# Patient Record
Sex: MALE | Race: WHITE | NOT HISPANIC OR LATINO | URBAN - METROPOLITAN AREA
[De-identification: names, ages, dates, MRNs, and addresses within clinical notes are randomized per-mention and may not be internally consistent; named-entity substitution may affect disease eponyms.]

---

## 2017-01-17 PROBLEM — C61 PROSTATE CANCER: Status: ACTIVE | Noted: 2017-01-17

## 2019-01-28 ENCOUNTER — HOSPITAL ENCOUNTER (INPATIENT)
Facility: OTHER | Age: 79
LOS: 1 days | DRG: 308 | End: 2019-01-29
Attending: EMERGENCY MEDICINE | Admitting: INTERNAL MEDICINE
Payer: MEDICARE

## 2019-01-28 DIAGNOSIS — I46.9 CARDIAC ARREST: Primary | ICD-10-CM

## 2019-01-28 DIAGNOSIS — J84.10 PULMONARY FIBROSIS: Chronic | ICD-10-CM

## 2019-01-28 DIAGNOSIS — R07.9 CHEST PAIN: ICD-10-CM

## 2019-01-28 DIAGNOSIS — G93.1 ANOXIC BRAIN INJURY: ICD-10-CM

## 2019-01-28 DIAGNOSIS — N17.9 ACUTE KIDNEY INJURY: ICD-10-CM

## 2019-01-28 DIAGNOSIS — J96.01 ACUTE HYPOXEMIC RESPIRATORY FAILURE: ICD-10-CM

## 2019-01-28 DIAGNOSIS — I21.4 NSTEMI (NON-ST ELEVATED MYOCARDIAL INFARCTION): ICD-10-CM

## 2019-01-28 PROBLEM — E87.20 LACTIC ACIDOSIS: Status: ACTIVE | Noted: 2019-01-28

## 2019-01-28 PROBLEM — C61 PROSTATE CANCER: Chronic | Status: ACTIVE | Noted: 2019-01-28

## 2019-01-28 PROBLEM — E78.5 HYPERLIPIDEMIA: Chronic | Status: ACTIVE | Noted: 2019-01-28

## 2019-01-28 PROBLEM — E87.20 METABOLIC ACIDOSIS: Status: ACTIVE | Noted: 2019-01-28

## 2019-01-28 PROBLEM — R79.89 ELEVATED TROPONIN: Status: ACTIVE | Noted: 2019-01-28

## 2019-01-28 PROBLEM — R79.89 ELEVATED BRAIN NATRIURETIC PEPTIDE (BNP) LEVEL: Status: ACTIVE | Noted: 2019-01-28

## 2019-01-28 PROBLEM — G93.41 METABOLIC ENCEPHALOPATHY: Status: ACTIVE | Noted: 2019-01-28

## 2019-01-28 PROBLEM — R74.01 TRANSAMINITIS: Status: ACTIVE | Noted: 2019-01-28

## 2019-01-28 LAB
ALBUMIN SERPL BCP-MCNC: 2.2 G/DL
ALBUMIN SERPL BCP-MCNC: 2.7 G/DL
ALLENS TEST: ABNORMAL
ALLENS TEST: ABNORMAL
ALP SERPL-CCNC: 141 U/L
ALP SERPL-CCNC: 179 U/L
ALT SERPL W/O P-5'-P-CCNC: 290 U/L
ALT SERPL W/O P-5'-P-CCNC: 372 U/L
ANION GAP SERPL CALC-SCNC: 17 MMOL/L
ANION GAP SERPL CALC-SCNC: 18 MMOL/L
ANION GAP SERPL CALC-SCNC: 22 MMOL/L
ANISOCYTOSIS BLD QL SMEAR: SLIGHT
APTT BLDCRRT: 35.4 SEC
AST SERPL-CCNC: 450 U/L
AST SERPL-CCNC: 675 U/L
BACTERIA #/AREA URNS HPF: ABNORMAL /HPF
BASOPHILS # BLD AUTO: ABNORMAL K/UL
BASOPHILS NFR BLD: 0 %
BILIRUB SERPL-MCNC: 0.7 MG/DL
BILIRUB SERPL-MCNC: 1.2 MG/DL
BILIRUB UR QL STRIP: ABNORMAL
BNP SERPL-MCNC: 850 PG/ML
BUN SERPL-MCNC: 32 MG/DL
BUN SERPL-MCNC: 35 MG/DL
BUN SERPL-MCNC: 35 MG/DL
BURR CELLS BLD QL SMEAR: ABNORMAL
CALCIUM SERPL-MCNC: 7.2 MG/DL
CALCIUM SERPL-MCNC: 7.2 MG/DL
CALCIUM SERPL-MCNC: 8.5 MG/DL
CHLORIDE SERPL-SCNC: 93 MMOL/L
CHLORIDE SERPL-SCNC: 95 MMOL/L
CHLORIDE SERPL-SCNC: 95 MMOL/L
CK SERPL-CCNC: 569 U/L
CLARITY UR: ABNORMAL
CO2 SERPL-SCNC: 22 MMOL/L
CO2 SERPL-SCNC: 23 MMOL/L
CO2 SERPL-SCNC: 23 MMOL/L
COLOR UR: ABNORMAL
CREAT SERPL-MCNC: 1.4 MG/DL
CREAT SERPL-MCNC: 1.5 MG/DL
CREAT SERPL-MCNC: 1.5 MG/DL
DACRYOCYTES BLD QL SMEAR: ABNORMAL
DELSYS: ABNORMAL
DELSYS: ABNORMAL
DIFFERENTIAL METHOD: ABNORMAL
EOSINOPHIL # BLD AUTO: ABNORMAL K/UL
EOSINOPHIL NFR BLD: 0 %
ERYTHROCYTE [DISTWIDTH] IN BLOOD BY AUTOMATED COUNT: 17.2 %
ERYTHROCYTE [SEDIMENTATION RATE] IN BLOOD BY WESTERGREN METHOD: 20 MM/H
ERYTHROCYTE [SEDIMENTATION RATE] IN BLOOD BY WESTERGREN METHOD: 25 MM/H
EST. GFR  (AFRICAN AMERICAN): 50 ML/MIN/1.73 M^2
EST. GFR  (AFRICAN AMERICAN): 50 ML/MIN/1.73 M^2
EST. GFR  (AFRICAN AMERICAN): 55 ML/MIN/1.73 M^2
EST. GFR  (NON AFRICAN AMERICAN): 44 ML/MIN/1.73 M^2
EST. GFR  (NON AFRICAN AMERICAN): 44 ML/MIN/1.73 M^2
EST. GFR  (NON AFRICAN AMERICAN): 47 ML/MIN/1.73 M^2
FIO2: 100
FIO2: 35
GIANT PLATELETS BLD QL SMEAR: PRESENT
GLUCOSE SERPL-MCNC: 168 MG/DL
GLUCOSE SERPL-MCNC: 228 MG/DL
GLUCOSE SERPL-MCNC: 230 MG/DL
GLUCOSE SERPL-MCNC: 230 MG/DL
GLUCOSE SERPL-MCNC: 233 MG/DL
GLUCOSE UR QL STRIP: NEGATIVE
HCO3 UR-SCNC: 19.6 MMOL/L (ref 24–28)
HCO3 UR-SCNC: 23.2 MMOL/L (ref 24–28)
HCT VFR BLD AUTO: 33.8 %
HGB BLD-MCNC: 10.8 G/DL
HGB UR QL STRIP: ABNORMAL
HYALINE CASTS #/AREA URNS LPF: 0 /LPF
INR PPP: 1.4
KETONES UR QL STRIP: NEGATIVE
LACTATE SERPL-SCNC: >12 MMOL/L
LEUKOCYTE ESTERASE UR QL STRIP: NEGATIVE
LYMPHOCYTES # BLD AUTO: ABNORMAL K/UL
LYMPHOCYTES NFR BLD: 23 %
MAGNESIUM SERPL-MCNC: 2.1 MG/DL
MAGNESIUM SERPL-MCNC: 2.7 MG/DL
MCH RBC QN AUTO: 29.4 PG
MCHC RBC AUTO-ENTMCNC: 32 G/DL
MCV RBC AUTO: 92 FL
METAMYELOCYTES NFR BLD MANUAL: 3 %
MICROSCOPIC COMMENT: ABNORMAL
MIN VOL: 10
MODE: ABNORMAL
MODE: ABNORMAL
MONOCYTES # BLD AUTO: ABNORMAL K/UL
MONOCYTES NFR BLD: 6 %
NEUTROPHILS # BLD AUTO: ABNORMAL K/UL
NEUTROPHILS NFR BLD: 65 %
NEUTS BAND NFR BLD MANUAL: 3 %
NITRITE UR QL STRIP: NEGATIVE
NON-SQ EPI CELLS #/AREA URNS HPF: 0 /HPF
PCO2 BLDA: 36.2 MMHG (ref 35–45)
PCO2 BLDA: 56.1 MMHG (ref 35–45)
PEEP: 5
PEEP: 5
PH SMN: 7.22 [PH] (ref 7.35–7.45)
PH SMN: 7.34 [PH] (ref 7.35–7.45)
PH UR STRIP: 6 [PH] (ref 5–8)
PHOSPHATE SERPL-MCNC: 7.5 MG/DL
PIP: 23
PLATELET # BLD AUTO: 119 K/UL
PLATELET BLD QL SMEAR: ABNORMAL
PMV BLD AUTO: 9.5 FL
PO2 BLDA: 160 MMHG (ref 80–100)
PO2 BLDA: 607 MMHG (ref 80–100)
POC BE: -4 MMOL/L
POC BE: -6 MMOL/L
POC SATURATED O2: 100 % (ref 95–100)
POC SATURATED O2: 99 % (ref 95–100)
POCT GLUCOSE: 194 MG/DL (ref 70–110)
POCT GLUCOSE: 49 MG/DL (ref 70–110)
POIKILOCYTOSIS BLD QL SMEAR: SLIGHT
POTASSIUM SERPL-SCNC: 3.4 MMOL/L
PROT SERPL-MCNC: 4.8 G/DL
PROT SERPL-MCNC: 6 G/DL
PROT UR QL STRIP: ABNORMAL
PROTHROMBIN TIME: 15.1 SEC
RBC # BLD AUTO: 3.67 M/UL
RBC #/AREA URNS HPF: >100 /HPF (ref 0–4)
SAMPLE: ABNORMAL
SAMPLE: ABNORMAL
SCHISTOCYTES BLD QL SMEAR: PRESENT
SITE: ABNORMAL
SITE: ABNORMAL
SODIUM SERPL-SCNC: 135 MMOL/L
SODIUM SERPL-SCNC: 135 MMOL/L
SODIUM SERPL-SCNC: 138 MMOL/L
SP GR UR STRIP: 1.02 (ref 1–1.03)
SQUAMOUS #/AREA URNS HPF: 2 /HPF
TROPONIN I SERPL DL<=0.01 NG/ML-MCNC: 0.15 NG/ML
TROPONIN I SERPL DL<=0.01 NG/ML-MCNC: 5.43 NG/ML
URN SPEC COLLECT METH UR: ABNORMAL
UROBILINOGEN UR STRIP-ACNC: NEGATIVE EU/DL
VT: 350
VT: 350
WBC # BLD AUTO: 14.75 K/UL
WBC #/AREA URNS HPF: 20 /HPF (ref 0–5)
WBC CLUMPS URNS QL MICRO: ABNORMAL
YEAST URNS QL MICRO: ABNORMAL

## 2019-01-28 PROCEDURE — 25000003 PHARM REV CODE 250: Performed by: EMERGENCY MEDICINE

## 2019-01-28 PROCEDURE — 36556 INSERT NON-TUNNEL CV CATH: CPT

## 2019-01-28 PROCEDURE — 85730 THROMBOPLASTIN TIME PARTIAL: CPT

## 2019-01-28 PROCEDURE — 80053 COMPREHEN METABOLIC PANEL: CPT | Mod: 91

## 2019-01-28 PROCEDURE — 96376 TX/PRO/DX INJ SAME DRUG ADON: CPT

## 2019-01-28 PROCEDURE — 87040 BLOOD CULTURE FOR BACTERIA: CPT | Mod: 59

## 2019-01-28 PROCEDURE — 27000221 HC OXYGEN, UP TO 24 HOURS

## 2019-01-28 PROCEDURE — 99291 CRITICAL CARE FIRST HOUR: CPT | Mod: 25

## 2019-01-28 PROCEDURE — 99291 CRITICAL CARE FIRST HOUR: CPT | Mod: ,,, | Performed by: INTERNAL MEDICINE

## 2019-01-28 PROCEDURE — 87070 CULTURE OTHR SPECIMN AEROBIC: CPT

## 2019-01-28 PROCEDURE — 82947 ASSAY GLUCOSE BLOOD QUANT: CPT

## 2019-01-28 PROCEDURE — 94002 VENT MGMT INPAT INIT DAY: CPT

## 2019-01-28 PROCEDURE — 96365 THER/PROPH/DIAG IV INF INIT: CPT

## 2019-01-28 PROCEDURE — 93005 ELECTROCARDIOGRAM TRACING: CPT

## 2019-01-28 PROCEDURE — 84484 ASSAY OF TROPONIN QUANT: CPT | Mod: 91

## 2019-01-28 PROCEDURE — 27100171 HC OXYGEN HIGH FLOW UP TO 24 HOURS

## 2019-01-28 PROCEDURE — 25000003 PHARM REV CODE 250: Performed by: INTERNAL MEDICINE

## 2019-01-28 PROCEDURE — 80053 COMPREHEN METABOLIC PANEL: CPT

## 2019-01-28 PROCEDURE — 87040 BLOOD CULTURE FOR BACTERIA: CPT

## 2019-01-28 PROCEDURE — 80048 BASIC METABOLIC PNL TOTAL CA: CPT

## 2019-01-28 PROCEDURE — 63600175 PHARM REV CODE 636 W HCPCS: Performed by: EMERGENCY MEDICINE

## 2019-01-28 PROCEDURE — 25000003 PHARM REV CODE 250

## 2019-01-28 PROCEDURE — 99291 PR CRITICAL CARE, E/M 30-74 MINUTES: ICD-10-PCS | Mod: ,,, | Performed by: INTERNAL MEDICINE

## 2019-01-28 PROCEDURE — 80061 LIPID PANEL: CPT

## 2019-01-28 PROCEDURE — 82962 GLUCOSE BLOOD TEST: CPT

## 2019-01-28 PROCEDURE — 36600 WITHDRAWAL OF ARTERIAL BLOOD: CPT

## 2019-01-28 PROCEDURE — 96375 TX/PRO/DX INJ NEW DRUG ADDON: CPT | Mod: 59

## 2019-01-28 PROCEDURE — 93010 ELECTROCARDIOGRAM REPORT: CPT | Mod: 76,,, | Performed by: INTERNAL MEDICINE

## 2019-01-28 PROCEDURE — 93010 EKG 12-LEAD: ICD-10-PCS | Mod: 76,,, | Performed by: INTERNAL MEDICINE

## 2019-01-28 PROCEDURE — 87205 SMEAR GRAM STAIN: CPT

## 2019-01-28 PROCEDURE — 99292 CRITICAL CARE ADDL 30 MIN: CPT | Mod: ,,, | Performed by: INTERNAL MEDICINE

## 2019-01-28 PROCEDURE — 84484 ASSAY OF TROPONIN QUANT: CPT

## 2019-01-28 PROCEDURE — 99292 PR CRITICAL CARE, ADDL 30 MIN: ICD-10-PCS | Mod: ,,, | Performed by: INTERNAL MEDICINE

## 2019-01-28 PROCEDURE — 36415 COLL VENOUS BLD VENIPUNCTURE: CPT

## 2019-01-28 PROCEDURE — 63600175 PHARM REV CODE 636 W HCPCS: Performed by: INTERNAL MEDICINE

## 2019-01-28 PROCEDURE — 99900026 HC AIRWAY MAINTENANCE (STAT)

## 2019-01-28 PROCEDURE — 85007 BL SMEAR W/DIFF WBC COUNT: CPT

## 2019-01-28 PROCEDURE — 82550 ASSAY OF CK (CPK): CPT

## 2019-01-28 PROCEDURE — 82803 BLOOD GASES ANY COMBINATION: CPT

## 2019-01-28 PROCEDURE — 83735 ASSAY OF MAGNESIUM: CPT | Mod: 91

## 2019-01-28 PROCEDURE — 96372 THER/PROPH/DIAG INJ SC/IM: CPT

## 2019-01-28 PROCEDURE — 94761 N-INVAS EAR/PLS OXIMETRY MLT: CPT

## 2019-01-28 PROCEDURE — 85027 COMPLETE CBC AUTOMATED: CPT

## 2019-01-28 PROCEDURE — 85610 PROTHROMBIN TIME: CPT

## 2019-01-28 PROCEDURE — 63600175 PHARM REV CODE 636 W HCPCS: Performed by: STUDENT IN AN ORGANIZED HEALTH CARE EDUCATION/TRAINING PROGRAM

## 2019-01-28 PROCEDURE — 83520 IMMUNOASSAY QUANT NOS NONAB: CPT

## 2019-01-28 PROCEDURE — 83880 ASSAY OF NATRIURETIC PEPTIDE: CPT

## 2019-01-28 PROCEDURE — 87086 URINE CULTURE/COLONY COUNT: CPT

## 2019-01-28 PROCEDURE — 81000 URINALYSIS NONAUTO W/SCOPE: CPT

## 2019-01-28 PROCEDURE — 99900035 HC TECH TIME PER 15 MIN (STAT)

## 2019-01-28 PROCEDURE — 83605 ASSAY OF LACTIC ACID: CPT

## 2019-01-28 PROCEDURE — 20000000 HC ICU ROOM

## 2019-01-28 PROCEDURE — 84100 ASSAY OF PHOSPHORUS: CPT

## 2019-01-28 PROCEDURE — 83735 ASSAY OF MAGNESIUM: CPT

## 2019-01-28 PROCEDURE — 93010 ELECTROCARDIOGRAM REPORT: CPT | Mod: ,,, | Performed by: INTERNAL MEDICINE

## 2019-01-28 PROCEDURE — C9113 INJ PANTOPRAZOLE SODIUM, VIA: HCPCS | Performed by: STUDENT IN AN ORGANIZED HEALTH CARE EDUCATION/TRAINING PROGRAM

## 2019-01-28 RX ORDER — AMIODARONE HYDROCHLORIDE 150 MG/3ML
INJECTION, SOLUTION INTRAVENOUS CODE/TRAUMA/SEDATION MEDICATION
Status: COMPLETED | OUTPATIENT
Start: 2019-01-28 | End: 2019-01-28

## 2019-01-28 RX ORDER — ACETAMINOPHEN 650 MG/20.3ML
650 LIQUID ORAL EVERY 6 HOURS
Status: DISCONTINUED | OUTPATIENT
Start: 2019-01-28 | End: 2019-01-29 | Stop reason: HOSPADM

## 2019-01-28 RX ORDER — SODIUM CHLORIDE 9 MG/ML
INJECTION, SOLUTION INTRAVENOUS
Status: COMPLETED | OUTPATIENT
Start: 2019-01-28 | End: 2019-01-28

## 2019-01-28 RX ORDER — PANTOPRAZOLE SODIUM 40 MG/10ML
80 INJECTION, POWDER, LYOPHILIZED, FOR SOLUTION INTRAVENOUS ONCE
Status: COMPLETED | OUTPATIENT
Start: 2019-01-28 | End: 2019-01-28

## 2019-01-28 RX ORDER — SODIUM CHLORIDE 0.9 % (FLUSH) 0.9 %
3 SYRINGE (ML) INJECTION
Status: DISCONTINUED | OUTPATIENT
Start: 2019-01-28 | End: 2019-01-29 | Stop reason: HOSPADM

## 2019-01-28 RX ORDER — BUSPIRONE HYDROCHLORIDE 10 MG/1
30 TABLET ORAL ONCE
Status: DISCONTINUED | OUTPATIENT
Start: 2019-01-28 | End: 2019-01-29 | Stop reason: HOSPADM

## 2019-01-28 RX ORDER — POTASSIUM CHLORIDE 14.9 MG/ML
20 INJECTION INTRAVENOUS ONCE
Status: COMPLETED | OUTPATIENT
Start: 2019-01-28 | End: 2019-01-28

## 2019-01-28 RX ORDER — MAGNESIUM SULFATE HEPTAHYDRATE 40 MG/ML
2 INJECTION, SOLUTION INTRAVENOUS
Status: COMPLETED | OUTPATIENT
Start: 2019-01-28 | End: 2019-01-28

## 2019-01-28 RX ORDER — VASOPRESSIN 20 [USP'U]/ML
INJECTION, SOLUTION INTRAMUSCULAR; SUBCUTANEOUS
Status: COMPLETED
Start: 2019-01-28 | End: 2019-01-28

## 2019-01-28 RX ORDER — PANTOPRAZOLE SODIUM 40 MG/10ML
40 INJECTION, POWDER, LYOPHILIZED, FOR SOLUTION INTRAVENOUS EVERY 12 HOURS
Status: DISCONTINUED | OUTPATIENT
Start: 2019-01-29 | End: 2019-01-29 | Stop reason: HOSPADM

## 2019-01-28 RX ORDER — VANCOMYCIN HCL IN 5 % DEXTROSE 1G/250ML
15 PLASTIC BAG, INJECTION (ML) INTRAVENOUS EVERY 24 HOURS
Status: DISCONTINUED | OUTPATIENT
Start: 2019-01-29 | End: 2019-01-29 | Stop reason: HOSPADM

## 2019-01-28 RX ADMIN — VANCOMYCIN HYDROCHLORIDE 2000 MG: 10 INJECTION, POWDER, LYOPHILIZED, FOR SOLUTION INTRAVENOUS at 06:01

## 2019-01-28 RX ADMIN — PANTOPRAZOLE SODIUM 80 MG: 40 INJECTION, POWDER, FOR SOLUTION INTRAVENOUS at 10:01

## 2019-01-28 RX ADMIN — MAGNESIUM SULFATE IN WATER 2 G: 40 INJECTION, SOLUTION INTRAVENOUS at 09:01

## 2019-01-28 RX ADMIN — NOREPINEPHRINE BITARTRATE 3 MCG/KG/MIN: 1 INJECTION, SOLUTION, CONCENTRATE INTRAVENOUS at 04:01

## 2019-01-28 RX ADMIN — AMIODARONE HYDROCHLORIDE 1 MG/MIN: 1.8 INJECTION, SOLUTION INTRAVENOUS at 05:01

## 2019-01-28 RX ADMIN — SODIUM CHLORIDE 1000 ML/HR: 0.9 INJECTION, SOLUTION INTRAVENOUS at 04:01

## 2019-01-28 RX ADMIN — DEXTROSE MONOHYDRATE 25 G: 25 INJECTION, SOLUTION INTRAVENOUS at 04:01

## 2019-01-28 RX ADMIN — AMIODARONE HYDROCHLORIDE 300 MG: 50 INJECTION, SOLUTION INTRAVENOUS at 04:01

## 2019-01-28 RX ADMIN — VASOPRESSIN 0.04 UNITS/MIN: 20 INJECTION, SOLUTION INTRAMUSCULAR; SUBCUTANEOUS at 10:01

## 2019-01-28 RX ADMIN — POTASSIUM CHLORIDE 20 MEQ: 14.9 INJECTION, SOLUTION INTRAVENOUS at 10:01

## 2019-01-28 RX ADMIN — NOREPINEPHRINE BITARTRATE 2.5 MCG/KG/MIN: 1 INJECTION INTRAVENOUS at 05:01

## 2019-01-28 RX ADMIN — VASOPRESSIN 0.04 UNITS/MIN: 20 INJECTION, SOLUTION INTRAMUSCULAR; SUBCUTANEOUS at 05:01

## 2019-01-28 RX ADMIN — VASOPRESSIN: 20 INJECTION, SOLUTION INTRAMUSCULAR; SUBCUTANEOUS at 04:01

## 2019-01-28 RX ADMIN — PIPERACILLIN AND TAZOBACTAM 4.5 G: 4; .5 INJECTION, POWDER, LYOPHILIZED, FOR SOLUTION INTRAVENOUS; PARENTERAL at 10:01

## 2019-01-28 RX ADMIN — SODIUM CHLORIDE 2250 ML: 0.9 INJECTION, SOLUTION INTRAVENOUS at 06:01

## 2019-01-28 RX ADMIN — AMIODARONE HYDROCHLORIDE 1 MG/MIN: 1.8 INJECTION, SOLUTION INTRAVENOUS at 09:01

## 2019-01-28 NOTE — CODE DOCUMENTATION
Levophed started 8 mg in 250 ml.  Rate started at 3mcg/kg/min.  MD at bedside to start central line.  Pressure 60 with doppler.

## 2019-01-28 NOTE — ED PROVIDER NOTES
Encounter Date: 1/28/2019    SCRIBE #1 NOTE: I, Khushbu Barba, am scribing for, and in the presence of, Dr. Valencia.       History     Chief Complaint   Patient presents with    Cardiac Arrest     pt  found down by wife .  pt down 15 min before ems arrival .     Time seen by provider: 4:40 PM    This is a 76 y.o. male who presents after being found down by his wife.  Upon EMS arrival, patient was pulseless, found to be in PEA arrest.  CPR and ACLS initiated, 3 rounds of epi given along with 1.5 amps of bicarb.  ROSC was obtained, however upon arrival pulses were once again lost.  The patient was down approximately ten to fifteen minutes before EMS arrival.       The history is provided by the EMS personnel. The history is limited by the condition of the patient.     Review of patient's allergies indicates:  No Known Allergies  Past Medical History:   Diagnosis Date    Hyperlipidemia     Prostate cancer     Pulmonary fibrosis      History reviewed. No pertinent surgical history.  Family History   Problem Relation Age of Onset    Heart disease Father      Social History     Tobacco Use    Smoking status: Unknown If Ever Smoked   Substance Use Topics    Alcohol use: Not on file    Drug use: Not on file     Review of Systems   Unable to perform ROS: Patient unresponsive       Physical Exam     Initial Vitals   BP Pulse Resp Temp SpO2   01/28/19 1622 01/28/19 1622 01/28/19 1627 01/28/19 1703 01/28/19 2015   (!) 142/74 86 10 96.7 °F (35.9 °C) 98 %      MAP       --                Physical Exam    Nursing note and vitals reviewed.  Constitutional:   Unresponsive.     HENT:   Head: Normocephalic and atraumatic.   Eyes:   Pupils fixed and dilated.   Neck:   C-collar in place   Pulmonary/Chest:   Coarse breath sounds with ventilation.  No spontaneous respirations   Abdominal: He exhibits no distension.   Genitourinary: Penis normal.   Musculoskeletal:   Peripheral edema with venous insufficiency in lower extremities  "  Neurological: He is unresponsive.   No posturing.  Unresponsive, does not withdraw to pain.         ED Course   Central Line  Date/Time: 1/28/2019 5:32 PM  Performed by: Oralia Valencia MD  Consent Done: Not Needed  Time out: Immediately prior to procedure a "time out" was called to verify the correct patient, procedure, equipment, support staff and site/side marked as required.  Indications: vascular access and hemodynamic monitoring  Preparation: skin prepped with ChloraPrep  Skin prep agent dried: skin prep agent completely dried prior to procedure  Sterile barriers: all five maximum sterile barriers used - cap, mask, sterile gown, sterile gloves, and large sterile sheet  Hand hygiene: hand hygiene performed prior to central venous catheter insertion  Location details: right femoral  Site selection rationale: post cardiac arrest  Catheter type: triple lumen  Catheter size: 7 Fr  Catheter Length: 20cm    Ultrasound guidance: yes  Vessel Caliber: medium, patent, compressibility normal  Needle advanced into vessel with real time Ultrasound guidance.  Guidewire confirmed in vessel.  Sterile sheath used.  Number of attempts: 1  Post-procedure: chlorhexidine patch,  line sutured,  sterile dressing applied and blood return through all ports    Critical Care  Date/Time: 1/28/2019 5:35 PM  Performed by: Oralia Valencia MD  Authorized by: Oralia Valencia MD   Direct patient critical care time: 15 minutes  Additional history critical care time: 10 minutes  Ordering / reviewing critical care time: 10 minutes  Documentation critical care time: 10 minutes  Consulting other physicians critical care time: 5 minutes  Consult with family critical care time: 15 minutes  Total critical care time (exclusive of procedural time) : 65 minutes  Critical care time was exclusive of separately billable procedures and treating other patients and teaching time.  Critical care was necessary to treat or prevent imminent or life-threatening " deterioration of the following conditions: circulatory failure, cardiac failure and respiratory failure.  Critical care was time spent personally by me on the following activities: evaluation of patient's response to treatment, obtaining history from patient or surrogate, ordering and review of laboratory studies, pulse oximetry, review of old charts, re-evaluation of patient's condition, ordering and review of radiographic studies, ordering and performing treatments and interventions, examination of patient and development of treatment plan with patient or surrogate.        Labs Reviewed   TROPONIN I - Abnormal; Notable for the following components:       Result Value    Troponin I 0.146 (*)     All other components within normal limits   COMPREHENSIVE METABOLIC PANEL - Abnormal; Notable for the following components:    Potassium 3.4 (*)     Chloride 93 (*)     Glucose 168 (*)     BUN, Bld 32 (*)     Calcium 8.5 (*)     Albumin 2.7 (*)     Alkaline Phosphatase 141 (*)      (*)      (*)     Anion Gap 22 (*)     eGFR if  55 (*)     eGFR if non  47 (*)     All other components within normal limits   CBC W/ AUTO DIFFERENTIAL - Abnormal; Notable for the following components:    WBC 14.75 (*)     RBC 3.67 (*)     Hemoglobin 10.8 (*)     Hematocrit 33.8 (*)     RDW 17.2 (*)     Platelets 119 (*)     All other components within normal limits   MAGNESIUM - Abnormal; Notable for the following components:    Magnesium 2.7 (*)     All other components within normal limits   LACTIC ACID, PLASMA - Abnormal; Notable for the following components:    Lactate (Lactic Acid) >12.0 (*)     All other components within normal limits    Narrative:       Lactic Acid critical result(s) called and verbal readback obtained   from Jose F Jaquez RN ER., 01/28/2019 16:57   URINALYSIS - Abnormal; Notable for the following components:    Color, UA Orange (*)     Appearance, UA Hazy (*)     Protein, UA  2+ (*)     Bilirubin (UA) 1+ (*)     Occult Blood UA 3+ (*)     All other components within normal limits   URINALYSIS MICROSCOPIC - Abnormal; Notable for the following components:    RBC, UA >100 (*)     WBC, UA 20 (*)     WBC Clumps, UA Occasional (*)     All other components within normal limits   B-TYPE NATRIURETIC PEPTIDE - Abnormal; Notable for the following components:     (*)     All other components within normal limits   POCT GLUCOSE - Abnormal; Notable for the following components:    POCT Glucose 49 (*)     All other components within normal limits   ISTAT PROCEDURE - Abnormal; Notable for the following components:    POC PH 7.225 (*)     POC PCO2 56.1 (*)     POC PO2 607 (*)     POC HCO3 23.2 (*)     All other components within normal limits   POCT GLUCOSE - Abnormal; Notable for the following components:    POCT Glucose 194 (*)     All other components within normal limits   APTT   PROTIME-INR   B-TYPE NATRIURETIC PEPTIDE   POCT GLUCOSE MONITORING CONTINUOUS     EKG Readings: (Independently Interpreted)   4:10PM:  Rate of 80.  Wide complex irregular QRS rhythm. Left axis deviation.  No other ischemic changes.     ECG Results    None       Imaging Results          X-Ray Chest AP Portable (Final result)  Result time 01/28/19 17:05:55    Final result by Naveen Mata MD (01/28/19 17:05:55)                 Impression:      1. Cardiomegaly with findings suggesting edema.  There is suspected chronic interstitial change noting fibrotic change projected over the right lower lung zone.  Comparison with previous examinations would be helpful.  No convincing pneumothorax.      Electronically signed by: Naveen Mata MD  Date:    01/28/2019  Time:    17:05             Narrative:    EXAMINATION:  XR CHEST AP PORTABLE    CLINICAL HISTORY:  Chest pain, unspecified    TECHNIQUE:  Single frontal view of the chest was performed.    COMPARISON:  None    FINDINGS:  Endotracheal tube tip lies approximately  5.4 cm above the aurea.  Defibrillator pads project over the left hemothorax.  The cardiomediastinal silhouette is enlarged, noting calcification of the aorta..  There is no pleural effusion.  The trachea is midline.  The lungs are symmetrically expanded bilaterally with coarse interstitial attenuation and patchy increased interstitial and parenchymal attenuation.  There is fibrotic change projected over the right lower lung zone and possibly left lower lung zone..  Evaluation of the left lower lung zone is limited secondary to artifact from defibrillator pads.  No large focal consolidation seen.  There is no convincing pneumothorax, noting line projected over the right apex is likely related to skin fold, pulmonary parenchymal markings are seen beyond this region..  The osseous structures are remarkable for degenerative changes.  Possible bone infarct noted of the right humerus.  There may be postsurgical change of the left humerus..                              X-Rays:   Independently Interpreted Readings:   Chest X-Ray: Trachea midline. Enlarged heart. Coarse interstitial markings.  No other acute findings.     Medical Decision Making:   History:   I obtained history from: EMS provider and someone other than patient.  Old Medical Records: I decided to obtain old medical records.  Initial Assessment:   4:40PM:  Patient is a 78-year-old male who presents to the emergency department status post cardiac arrest.  Upon arrival, patient did lose pulses again after obtaining ROSC by EMS.  Will initiate ACLS protocol.  Airway has been established and an IO placed.  Will continue to follow  Independently Interpreted Test(s):   I have ordered and independently interpreted X-rays - see prior notes.  I have ordered and independently interpreted EKG Reading(s) - see prior notes  Clinical Tests:   Lab Tests: Ordered and Reviewed  Radiological Study: Ordered and Reviewed  Medical Tests: Ordered and Reviewed  Other:   I have  discussed this case with another health care provider.    4:52 PM:  ROSC obtained after 1 round of epi and 1 defibrillation.  Patient also given amiodarone.  Patient does have a wide complex rhythm on the heart monitor with low blood pressures.  Will plan to initiate vasopressors, along with central line placement.  Patient's wife is in the department.  I did update her regarding what had happened, and his critical status at the time.  I did also speak with his daughter who lives in [place].  I discussed with them regarding code status.  They wish for him to remain full code until at least the 2 daughters, who are coming from out of town, can arrive at bedside.  They do understand the critical nature of his condition.  They do understand that his heart is likely to stop again in the near future.  Will continue to follow.    5:18 PM:  I discussed the patient with Dr. Acevedo, patient will be admitted to Dr. Taylor.      5:36PM:  Patient remains stable, however requiring a significant amount of vasopressor support.  He will be admitted to the ICU.  Family at bedside at this time.            Scribe Attestation:   Scribe #1: I performed the above scribed service and the documentation accurately describes the services I performed. I attest to the accuracy of the note.    Attending Attestation:           Physician Attestation for Scribe:  Physician Attestation Statement for Scribe #1: I, Dr. Valencia, reviewed documentation, as scribed by Khushbu Barba in my presence, and it is both accurate and complete.                    Clinical Impression:     1. Cardiac arrest    2. Chest pain                                   Oralia Valencia MD  01/28/19 9665

## 2019-01-28 NOTE — PROGRESS NOTES
Patient presented to the ER orally intubated with a size 7.0 ETT secured 24 @ lip and placed on charted settings.  All alarms are set and audible.  Ambu and mask a HOB.  Will continue to monitor closely.

## 2019-01-28 NOTE — PROGRESS NOTES
Patient received from ED via AMBU at 100%.  Placed on  Vent with previous settings as documented.  All alarms on and audible.  Ambu and mask at bedside.  ETT secure at 24CM.  Vent plugged into red outlet and wheels are locked.  Patient is unresponsive and has no gag reflex.

## 2019-01-29 VITALS
WEIGHT: 166.69 LBS | BODY MASS INDEX: 26.16 KG/M2 | SYSTOLIC BLOOD PRESSURE: 60 MMHG | RESPIRATION RATE: 6 BRPM | OXYGEN SATURATION: 86 % | TEMPERATURE: 95 F | HEART RATE: 56 BPM | DIASTOLIC BLOOD PRESSURE: 30 MMHG | HEIGHT: 67 IN

## 2019-01-29 PROBLEM — G93.1 ANOXIC BRAIN INJURY: Status: ACTIVE | Noted: 2019-01-28

## 2019-01-29 LAB
ALLENS TEST: ABNORMAL
ALLENS TEST: ABNORMAL
ANION GAP SERPL CALC-SCNC: 21 MMOL/L
ANION GAP SERPL CALC-SCNC: 23 MMOL/L
ANION GAP SERPL CALC-SCNC: 24 MMOL/L
ANISOCYTOSIS BLD QL SMEAR: SLIGHT
AORTIC ROOT ANNULUS: 3.64 CM
AORTIC VALVE CUSP SEPERATION: 2.01 CM
APTT BLDCRRT: 54.1 SEC
AV INDEX (PROSTH): 0.51
AV MEAN GRADIENT: 3.1 MMHG
AV PEAK GRADIENT: 5.38 MMHG
AV VALVE AREA: 1.85 CM2
AV VELOCITY RATIO: 0.48
BACTERIA UR CULT: NO GROWTH
BASOPHILS # BLD AUTO: ABNORMAL K/UL
BASOPHILS NFR BLD: 0 %
BSA FOR ECHO PROCEDURE: 1.89 M2
BUN SERPL-MCNC: 36 MG/DL
BUN SERPL-MCNC: 38 MG/DL
BUN SERPL-MCNC: 39 MG/DL
BURR CELLS BLD QL SMEAR: ABNORMAL
CA-I BLDV-SCNC: 0.88 MMOL/L
CALCIUM SERPL-MCNC: 6.9 MG/DL
CALCIUM SERPL-MCNC: 7.2 MG/DL
CALCIUM SERPL-MCNC: 7.2 MG/DL
CHLORIDE SERPL-SCNC: 94 MMOL/L
CHLORIDE SERPL-SCNC: 95 MMOL/L
CHLORIDE SERPL-SCNC: 97 MMOL/L
CHOLEST SERPL-MCNC: 67 MG/DL
CHOLEST/HDLC SERPL: 2.2 {RATIO}
CO2 SERPL-SCNC: 14 MMOL/L
CO2 SERPL-SCNC: 16 MMOL/L
CO2 SERPL-SCNC: 18 MMOL/L
CREAT SERPL-MCNC: 1.7 MG/DL
CREAT SERPL-MCNC: 1.9 MG/DL
CREAT SERPL-MCNC: 2 MG/DL
CV ECHO LV RWT: 0.58 CM
DACRYOCYTES BLD QL SMEAR: ABNORMAL
DELSYS: ABNORMAL
DELSYS: ABNORMAL
DIFFERENTIAL METHOD: ABNORMAL
DOP CALC AO PEAK VEL: 1.16 M/S
DOP CALC AO VTI: 15.8 CM
DOP CALC LVOT AREA: 3.59 CM2
DOP CALC LVOT DIAMETER: 2.14 CM
DOP CALC LVOT PEAK VEL: 0.56 M/S
DOP CALC LVOT STROKE VOLUME: 29.19 CM3
DOP CALCLVOT PEAK VEL VTI: 8.12 CM
E WAVE DECELERATION TIME: 240.2 MSEC
E/A RATIO: 2.11
E/E' RATIO: 6.71
ECHO LV POSTERIOR WALL: 1.33 CM (ref 0.6–1.1)
EOSINOPHIL # BLD AUTO: ABNORMAL K/UL
EOSINOPHIL NFR BLD: 0 %
ERYTHROCYTE [DISTWIDTH] IN BLOOD BY AUTOMATED COUNT: 17.4 %
ERYTHROCYTE [SEDIMENTATION RATE] IN BLOOD BY WESTERGREN METHOD: 25 MM/H
ERYTHROCYTE [SEDIMENTATION RATE] IN BLOOD BY WESTERGREN METHOD: 28 MM/H
EST. GFR  (AFRICAN AMERICAN): 36 ML/MIN/1.73 M^2
EST. GFR  (AFRICAN AMERICAN): 38 ML/MIN/1.73 M^2
EST. GFR  (AFRICAN AMERICAN): 43 ML/MIN/1.73 M^2
EST. GFR  (NON AFRICAN AMERICAN): 31 ML/MIN/1.73 M^2
EST. GFR  (NON AFRICAN AMERICAN): 33 ML/MIN/1.73 M^2
EST. GFR  (NON AFRICAN AMERICAN): 38 ML/MIN/1.73 M^2
FIO2: 30
FIO2: 30
FRACTIONAL SHORTENING: 16 % (ref 28–44)
GIANT PLATELETS BLD QL SMEAR: PRESENT
GLUCOSE SERPL-MCNC: 112 MG/DL
GLUCOSE SERPL-MCNC: 112 MG/DL
GLUCOSE SERPL-MCNC: 174 MG/DL
GLUCOSE SERPL-MCNC: 174 MG/DL
GLUCOSE SERPL-MCNC: 86 MG/DL
GLUCOSE SERPL-MCNC: 86 MG/DL
HCO3 UR-SCNC: 12 MMOL/L (ref 24–28)
HCO3 UR-SCNC: 12.1 MMOL/L (ref 24–28)
HCT VFR BLD AUTO: 29.2 %
HDLC SERPL-MCNC: 31 MG/DL
HDLC SERPL: 46.3 %
HGB BLD-MCNC: 9 G/DL
HYPOCHROMIA BLD QL SMEAR: ABNORMAL
INR PPP: 2.5
INTERVENTRICULAR SEPTUM: 1.26 CM (ref 0.6–1.1)
LA MAJOR: 7.21 CM
LA MINOR: 6.32 CM
LA WIDTH: 5.11 CM
LACTATE SERPL-SCNC: >12 MMOL/L
LACTATE SERPL-SCNC: >12 MMOL/L
LDLC SERPL CALC-MCNC: 29 MG/DL
LEFT ATRIUM SIZE: 3.71 CM
LEFT ATRIUM VOLUME: 108.54 CM3
LEFT INTERNAL DIMENSION IN SYSTOLE: 3.88 CM (ref 2.1–4)
LEFT VENTRICLE DIASTOLIC VOLUME: 98.03 ML
LEFT VENTRICLE SYSTOLIC VOLUME: 65.14 ML
LEFT VENTRICULAR INTERNAL DIMENSION IN DIASTOLE: 4.61 CM (ref 3.5–6)
LEFT VENTRICULAR MASS: 229.63 G
LV LATERAL E/E' RATIO: 7.13
LV SEPTAL E/E' RATIO: 6.33
LYMPHOCYTES # BLD AUTO: ABNORMAL K/UL
LYMPHOCYTES NFR BLD: 6 %
MAGNESIUM SERPL-MCNC: 2.4 MG/DL
MAGNESIUM SERPL-MCNC: 2.5 MG/DL
MAGNESIUM SERPL-MCNC: 2.7 MG/DL
MCH RBC QN AUTO: 29 PG
MCHC RBC AUTO-ENTMCNC: 30.8 G/DL
MCV RBC AUTO: 94 FL
MIN VOL: 13.5
MODE: ABNORMAL
MODE: ABNORMAL
MONOCYTES # BLD AUTO: ABNORMAL K/UL
MONOCYTES NFR BLD: 1 %
MV PEAK A VEL: 0.27 M/S
MV PEAK E VEL: 0.57 M/S
NEUTROPHILS NFR BLD: 0 %
NEUTS BAND NFR BLD MANUAL: 93 %
NONHDLC SERPL-MCNC: 36 MG/DL
PCO2 BLDA: 26.4 MMHG (ref 35–45)
PCO2 BLDA: 27.8 MMHG (ref 35–45)
PEEP: 5
PEEP: 5
PH SMN: 7.24 [PH] (ref 7.35–7.45)
PH SMN: 7.27 [PH] (ref 7.35–7.45)
PHOSPHATE SERPL-MCNC: 5.6 MG/DL
PHOSPHATE SERPL-MCNC: 6.1 MG/DL
PHOSPHATE SERPL-MCNC: 7.1 MG/DL
PIP: 32
PISA TR MAX VEL: 2.81 M/S
PLATELET # BLD AUTO: 127 K/UL
PMV BLD AUTO: 9.4 FL
PO2 BLDA: 108 MMHG (ref 80–100)
PO2 BLDA: 90 MMHG (ref 80–100)
POC BE: -15 MMOL/L
POC BE: -15 MMOL/L
POC SATURATED O2: 96 % (ref 95–100)
POC SATURATED O2: 97 % (ref 95–100)
POLYCHROMASIA BLD QL SMEAR: ABNORMAL
POTASSIUM SERPL-SCNC: 3.2 MMOL/L
POTASSIUM SERPL-SCNC: 3.6 MMOL/L
POTASSIUM SERPL-SCNC: 3.7 MMOL/L
PROTHROMBIN TIME: 27.4 SEC
PV PEAK VELOCITY: 0.8 CM/S
RA MAJOR: 8.02 CM
RA PRESSURE: 3 MMHG
RA WIDTH: 4.58 CM
RBC # BLD AUTO: 3.1 M/UL
SAMPLE: ABNORMAL
SAMPLE: ABNORMAL
SCHISTOCYTES BLD QL SMEAR: PRESENT
SINUS: 3.39 CM
SITE: ABNORMAL
SITE: ABNORMAL
SODIUM SERPL-SCNC: 133 MMOL/L
SODIUM SERPL-SCNC: 134 MMOL/L
SODIUM SERPL-SCNC: 135 MMOL/L
SP02: 100
SP02: 92
STJ: 2.5 CM
TDI LATERAL: 0.08
TDI SEPTAL: 0.09
TDI: 0.09
TR MAX PG: 31.58 MMHG
TRIGL SERPL-MCNC: 35 MG/DL
TROPONIN I SERPL DL<=0.01 NG/ML-MCNC: 26.58 NG/ML
TROPONIN I SERPL DL<=0.01 NG/ML-MCNC: >50 NG/ML
TROPONIN I SERPL DL<=0.01 NG/ML-MCNC: >50 NG/ML
TV REST PULMONARY ARTERY PRESSURE: 35 MMHG
VT: 350
VT: 350
WBC # BLD AUTO: 23.76 K/UL

## 2019-01-29 PROCEDURE — 93010 EKG 12-LEAD: ICD-10-PCS | Mod: ,,, | Performed by: INTERNAL MEDICINE

## 2019-01-29 PROCEDURE — 84484 ASSAY OF TROPONIN QUANT: CPT

## 2019-01-29 PROCEDURE — 25000003 PHARM REV CODE 250: Performed by: INTERNAL MEDICINE

## 2019-01-29 PROCEDURE — 25000003 PHARM REV CODE 250: Performed by: STUDENT IN AN ORGANIZED HEALTH CARE EDUCATION/TRAINING PROGRAM

## 2019-01-29 PROCEDURE — 63600175 PHARM REV CODE 636 W HCPCS: Performed by: INTERNAL MEDICINE

## 2019-01-29 PROCEDURE — 36600 WITHDRAWAL OF ARTERIAL BLOOD: CPT

## 2019-01-29 PROCEDURE — 99900035 HC TECH TIME PER 15 MIN (STAT)

## 2019-01-29 PROCEDURE — 84100 ASSAY OF PHOSPHORUS: CPT | Mod: 91

## 2019-01-29 PROCEDURE — 94761 N-INVAS EAR/PLS OXIMETRY MLT: CPT

## 2019-01-29 PROCEDURE — 83605 ASSAY OF LACTIC ACID: CPT

## 2019-01-29 PROCEDURE — C9113 INJ PANTOPRAZOLE SODIUM, VIA: HCPCS | Performed by: STUDENT IN AN ORGANIZED HEALTH CARE EDUCATION/TRAINING PROGRAM

## 2019-01-29 PROCEDURE — 63600175 PHARM REV CODE 636 W HCPCS: Performed by: STUDENT IN AN ORGANIZED HEALTH CARE EDUCATION/TRAINING PROGRAM

## 2019-01-29 PROCEDURE — 85007 BL SMEAR W/DIFF WBC COUNT: CPT

## 2019-01-29 PROCEDURE — 82803 BLOOD GASES ANY COMBINATION: CPT

## 2019-01-29 PROCEDURE — 93005 ELECTROCARDIOGRAM TRACING: CPT

## 2019-01-29 PROCEDURE — 99233 PR SUBSEQUENT HOSPITAL CARE,LEVL III: ICD-10-PCS | Mod: ,,, | Performed by: HOSPITALIST

## 2019-01-29 PROCEDURE — 85610 PROTHROMBIN TIME: CPT

## 2019-01-29 PROCEDURE — 85730 THROMBOPLASTIN TIME PARTIAL: CPT

## 2019-01-29 PROCEDURE — 84100 ASSAY OF PHOSPHORUS: CPT

## 2019-01-29 PROCEDURE — 84484 ASSAY OF TROPONIN QUANT: CPT | Mod: 91

## 2019-01-29 PROCEDURE — 80048 BASIC METABOLIC PNL TOTAL CA: CPT | Mod: 91

## 2019-01-29 PROCEDURE — 83735 ASSAY OF MAGNESIUM: CPT

## 2019-01-29 PROCEDURE — 99233 SBSQ HOSP IP/OBS HIGH 50: CPT | Mod: ,,, | Performed by: HOSPITALIST

## 2019-01-29 PROCEDURE — 25000003 PHARM REV CODE 250: Performed by: EMERGENCY MEDICINE

## 2019-01-29 PROCEDURE — 82330 ASSAY OF CALCIUM: CPT

## 2019-01-29 PROCEDURE — 80048 BASIC METABOLIC PNL TOTAL CA: CPT

## 2019-01-29 PROCEDURE — 83735 ASSAY OF MAGNESIUM: CPT | Mod: 91

## 2019-01-29 PROCEDURE — 93010 ELECTROCARDIOGRAM REPORT: CPT | Mod: ,,, | Performed by: INTERNAL MEDICINE

## 2019-01-29 PROCEDURE — 85027 COMPLETE CBC AUTOMATED: CPT

## 2019-01-29 PROCEDURE — 83605 ASSAY OF LACTIC ACID: CPT | Mod: 91

## 2019-01-29 RX ORDER — MORPHINE SULFATE 2 MG/ML
1 INJECTION, SOLUTION INTRAMUSCULAR; INTRAVENOUS
Status: DISCONTINUED | OUTPATIENT
Start: 2019-01-29 | End: 2019-01-29 | Stop reason: HOSPADM

## 2019-01-29 RX ORDER — CHLORHEXIDINE GLUCONATE ORAL RINSE 1.2 MG/ML
15 SOLUTION DENTAL 2 TIMES DAILY
Status: DISCONTINUED | OUTPATIENT
Start: 2019-01-29 | End: 2019-01-29 | Stop reason: HOSPADM

## 2019-01-29 RX ORDER — POTASSIUM CHLORIDE 14.9 MG/ML
20 INJECTION INTRAVENOUS ONCE
Status: COMPLETED | OUTPATIENT
Start: 2019-01-29 | End: 2019-01-29

## 2019-01-29 RX ORDER — GLYCOPYRROLATE 0.2 MG/ML
0.3 INJECTION INTRAMUSCULAR; INTRAVENOUS ONCE
Status: COMPLETED | OUTPATIENT
Start: 2019-01-29 | End: 2019-01-29

## 2019-01-29 RX ORDER — SODIUM BICARBONATE 1 MEQ/ML
50 SYRINGE (ML) INTRAVENOUS ONCE
Status: COMPLETED | OUTPATIENT
Start: 2019-01-29 | End: 2019-01-29

## 2019-01-29 RX ADMIN — PANTOPRAZOLE SODIUM 40 MG: 40 INJECTION, POWDER, FOR SOLUTION INTRAVENOUS at 09:01

## 2019-01-29 RX ADMIN — SODIUM BICARBONATE 50 MEQ: 84 INJECTION, SOLUTION INTRAVENOUS at 05:01

## 2019-01-29 RX ADMIN — NOREPINEPHRINE BITARTRATE 1.7 MCG/KG/MIN: 1 INJECTION INTRAVENOUS at 07:01

## 2019-01-29 RX ADMIN — PIPERACILLIN AND TAZOBACTAM 4.5 G: 4; .5 INJECTION, POWDER, LYOPHILIZED, FOR SOLUTION INTRAVENOUS; PARENTERAL at 06:01

## 2019-01-29 RX ADMIN — VASOPRESSIN 0.04 UNITS/MIN: 20 INJECTION, SOLUTION INTRAMUSCULAR; SUBCUTANEOUS at 07:01

## 2019-01-29 RX ADMIN — CHLORHEXIDINE GLUCONATE 15 ML: 1.2 RINSE ORAL at 09:01

## 2019-01-29 RX ADMIN — POTASSIUM CHLORIDE 20 MEQ: 14.9 INJECTION, SOLUTION INTRAVENOUS at 02:01

## 2019-01-29 RX ADMIN — NOREPINEPHRINE BITARTRATE 1.7 MCG/KG/MIN: 1 INJECTION INTRAVENOUS at 03:01

## 2019-01-29 RX ADMIN — AMIODARONE HYDROCHLORIDE 0.5 MG/MIN: 1.8 INJECTION, SOLUTION INTRAVENOUS at 03:01

## 2019-01-29 RX ADMIN — NOREPINEPHRINE BITARTRATE 2.06 MCG/KG/MIN: 1 INJECTION INTRAVENOUS at 11:01

## 2019-01-29 RX ADMIN — GLYCOPYRROLATE 0.3 MG: 0.2 INJECTION, SOLUTION INTRAMUSCULAR; INTRAVENOUS at 12:01

## 2019-01-29 RX ADMIN — VANCOMYCIN HYDROCHLORIDE 1000 MG: 1 INJECTION, POWDER, FOR SOLUTION INTRAVENOUS at 05:01

## 2019-01-29 NOTE — ASSESSMENT & PLAN NOTE
Continue with mechanical ventilation.  Adjust ventilator settings as recommended by Pulmonary Critical Care.

## 2019-01-29 NOTE — ASSESSMENT & PLAN NOTE
- Unknown baseline, last labs in [facility] system (2017) show Cr 0.8, suspect DANA in setting of arrest

## 2019-01-29 NOTE — PROGRESS NOTES
"[facility] Hospital Medicine  Progress Note    Patient Name: Duane Fulton  MRN: [id number]  Patient Class: IP- Inpatient   Admission Date: 1/28/2019  Length of Stay: 1 days  Attending Physician: Robert Nguyen MD  Primary Care Provider: Primary Doctor No        Subjective:     Principal Problem:Cardiac arrest    HPI:  Mr. Fulton is a 78 year-old man with a history of pulmonary fibrosis, prostate cancer, dyslipidemia who presented to emergency department on 1/28/2019 following cardiac arrest at his home. Per wife he returned from cardiology appointment in the evening and went upstairs and she reports that she shortly found him slumped over the edge of the bathtub on the floor. She called 911 and attempted to flip him to perform CPR but was unsuccessful.  CPR was initiated by EMS; unclear how long he was down, per wife "maybe about 15 minutes".  Return of spontaneous circulation (ROSC) obtained in the field and in was intubated.  Upon arrival to the emergency department, patient become pulseless again.  Patient underwent further resuscitation efforts.  Patient noted to be in ventricular fibrillation and shocked and then later noted to have pulseless electrical activity arrest.  Patient regained his pulse but remained unresponsive and hypotensive.  Patient started on continuous infusions of norepinephrine and vasopressin along with intravenous amiodarone Emergency Department.  Patient admitted to the intensive care unit at [facility].    Hospital Course:  Mr. Fulton is a 78 year-old man with a history of pulmonary fibrosis, prostate cancer, dyslipidemia who suffered an out-of-hospital cardiac arrest who was resuscitated by EMS in the field but subsequently suffered a second cardiac arrest in the emergency department.  Patient initially noted to be in ventricular fibrillation on presentation subsequent developed pulseless electrical activity.  He regained his pulse but remained " unresponsive and hypotensive requiring vasopressor support.  Patient admitted to the intensive care unit.  Patient noted to have significant coffee-ground output from his nasogastric tube.  Patient started on proton pump inhibitor therapy with improvement.  Hemoglobin stable.  Patient has remained unresponsive and continues to require vasopressor support to maintain reasonable blood pressure. Targeted temperature management ordered however patient became hypothermic without active cooling.  CT head performed last night without evidence of acute intracranial bleeding or hydrocephalus.  Patient with poor gray-white matter differentiation concerning for possible global anoxic hypoxic injury.   Patient also with evidence of renal failure with worsening metabolic acidosis and anuric.  Following discussions with the family overnight, decision was made to not pursue renal replacement therapy and not to pursue further chest compressions in the event of a 3rd cardiac arrest.    Interval History:  Patient unresponsive off any sedation.  Patient became hypothermic on his own.  Patient is still hypotensive requiring vasopressor support.  Patient with evidence of worsening renal failure.  Patient unresponsive.    Review of Systems   Unable to perform ROS: Intubated     Objective:     Vital Signs (Most Recent):  Temp: (!) 94.6 °F (34.8 °C) (01/29/19 0545)  Pulse: (!) 50 (01/29/19 0545)  Resp: (!) 27 (01/29/19 0545)  BP: (!) 94/55 (01/29/19 0545)  SpO2: 95 % (01/29/19 0545) Vital Signs (24h Range):  Temp:  [94.6 °F (34.8 °C)-96.7 °F (35.9 °C)] 94.6 °F (34.8 °C)  Pulse:  [42-86] 50  Resp:  [0-33] 27  SpO2:  [89 %-100 %] 95 %  BP: ()/() 94/55     Weight: 75.6 kg (166 lb 10.7 oz)  Body mass index is 26.1 kg/m².    Intake/Output Summary (Last 24 hours) at 1/29/2019 0812  Last data filed at 1/29/2019 0558  Gross per 24 hour   Intake 3051.72 ml   Output 10 ml   Net 3041.72 ml      Physical Exam   Constitutional:   Intubated  and unresponsive.  Appears to be breathing over the ventilator.   HENT:   Oral gastric tube with some coffee ground appearing output.   Cardiovascular: Normal rate, regular rhythm, normal heart sounds and intact distal pulses. Exam reveals no gallop and no friction rub.   No murmur heard.  Triple-lumen in his right groin.   Pulmonary/Chest: Effort normal and breath sounds normal. No respiratory distress. He has no wheezes. He has no rales.   Abdominal: Soft. Bowel sounds are normal. He exhibits no distension. There is no tenderness.   Musculoskeletal: Normal range of motion. He exhibits no edema or tenderness.   Neurological:   Unresponsive off all sedation.  Bilaterally dilated and unresponsive pupils.   Skin: Skin is dry. No rash noted. No erythema. No pallor.   Cool to touch.  Distal pulses appreciated.       Significant Labs: All pertinent labs within the past 24 hours have been reviewed.    Significant Imaging: I have reviewed all pertinent imaging results/findings within the past 24 hours.    Assessment/Plan:      * Cardiac arrest    Likely secondary acute pulmonary embolism. Possible acute myocardial infarction however without evidence of ST elevation on electrocardiogram.   Anticoagulation not started due to evidence of upper gastrointestinal bleeding.  Elevated serum troponins likely due to global hypoperfusion from cardiac arrest.  Patient appears to have suffered anoxic brain injury. Prognosis is poor.  Will rewarm patient per protocol.  Following discussions with the family, not pursue renal replacement therapy despite worsening renal failure and also will not subject the patient to further chest compressions in the event of a 3rd cardiac arrest.  Otherwise will continue with supportive measures at this time.     Anoxic brain injury    Patient with dilated and unresponsive pupils.  Patient otherwise unresponsive without sarah corneal reflexes.  Head CT concerning for anoxic brain injury likely secondary to  global hypoperfusion due to cardiac arrests.  Continue with supportive care measures.  Continue with Targeted temperature management protocol.     Acute hypoxemic respiratory failure    Continue with mechanical ventilation.  Adjust ventilator settings as recommended by Pulmonary Critical Care.     Lactic acidosis    Persistent despite reasonable volume resuscitation.  Patient appears more less euvolemic at this time.  Patient has been treated with broad-spectrum antibiotics which is reasonable for now pending culture results however low suspicion for infectious treated his cardiac arrest.     Acute kidney injury    Secondary to cardiac arrest.  Patient with out urine output and with evidence of worsening metabolic acidosis.  Family not interested in pursuing dialysis despite worsening kidney function.  Continue with supportive care.     Metabolic acidosis    Secondary to worsening renal failure.  Family 9% and renal placement therapy.  Continue with supportive care.       Elevated brain natriuretic peptide (BNP) level    Echocardiogram ordered.  Does not appear overly hypervolemic at this time.     Elevated troponin    Secondary to cardiac arrest.  Continue with supportive measures.     Pulmonary fibrosis    Stable.       Transaminitis    Likely secondary to global hypoperfusion with insult to the liver.  Continue supportive care measures.       VTE Risk Mitigation (From admission, onward)        Ordered     IP VTE HIGH RISK PATIENT  Once      01/28/19 1802     Place sequential compression device  Until discontinued      01/28/19 1721              Robert Nguyen MD  Department of Hospital Medicine   [facility]

## 2019-01-29 NOTE — HPI
"Mr. Fulton is a 78 year-old man with a history of pulmonary fibrosis, prostate cancer, dyslipidemia who presented to emergency department on 1/28/2019 following cardiac arrest at his home. Per wife he returned from cardiology appointment in the evening and went upstairs and she reports that she shortly found him slumped over the edge of the bathtub on the floor. She called 911 and attempted to flip him to perform CPR but was unsuccessful.  CPR was initiated by EMS; unclear how long he was down, per wife "maybe about 15 minutes".  Return of spontaneous circulation (ROSC) obtained in the field and in was intubated.  Upon arrival to the emergency department, patient become pulseless again.  Patient underwent further resuscitation efforts.  Patient noted to be in ventricular fibrillation and shocked and then later noted to have pulseless electrical activity arrest.  Patient regained his pulse but remained unresponsive and hypotensive.  Patient started on continuous infusions of norepinephrine and vasopressin along with intravenous amiodarone Emergency Department.  Patient admitted to the intensive care unit at [facility].  "

## 2019-01-29 NOTE — ASSESSMENT & PLAN NOTE
Secondary to worsening renal failure.  Family 9% and renal placement therapy.  Continue with supportive care.

## 2019-01-29 NOTE — CARE UPDATE
Spoke to patient's wife, 2 daughters, and brother. I explained patient's diagnosis and prognosis. Family was very understanding and all of their questions were answered. They explained that the patient would not want prolonged life support if his outcome was poor. At this time, we will continue with the cooling protocol as family continues to spend time with the patient. I explained the re-warming process and neuro prognostication. While we continue to support the patient, patient is now DNR.     No chest compressions or dialysis.    Gely Lorenzo MD  [facility] Fellow

## 2019-01-29 NOTE — ASSESSMENT & PLAN NOTE
Likely secondary acute pulmonary embolism. Possible acute myocardial infarction however without evidence of ST elevation on electrocardiogram.   Anticoagulation not started due to evidence of upper gastrointestinal bleeding.  Elevated serum troponins likely due to global hypoperfusion from cardiac arrest.  Patient appears to have suffered anoxic brain injury. Prognosis is poor.  Will rewarm patient per protocol.  Following discussions with the family, not pursue renal replacement therapy despite worsening renal failure and also will not subject the patient to further chest compressions in the event of a 3rd cardiac arrest.  Otherwise will continue with supportive measures at this time.

## 2019-01-29 NOTE — ASSESSMENT & PLAN NOTE
- 2/2 arrest  - GCS 3, pupils fixed and dilated, no corneal, no gag, no withdrawal to pain  - CT Head WO to rule out acute neurological process

## 2019-01-29 NOTE — PLAN OF CARE
Problem: Adult Inpatient Plan of Care  Goal: Plan of Care Review  Outcome: Ongoing (interventions implemented as appropriate)  Patient remains unresponsive with GCS 3.  Pupils are fixed and dilated, no gag reflex.   Troponin trending upward, M.D. Aware.  UOP < 10cc overnight.  ABG drawn this a.m., one amp of Bicarb given.  Approx 200 cc of bloody drainage from OGT. Protonix ordered per Dr. Lorenzo.  Daughters at bedside overnight. Free from falls and injury.

## 2019-01-29 NOTE — ASSESSMENT & PLAN NOTE
Patient with dilated and unresponsive pupils.  Patient otherwise unresponsive without sarah corneal reflexes.  Head CT concerning for anoxic brain injury likely secondary to global hypoperfusion due to cardiac arrests.  Continue with supportive care measures.  Continue with Targeted temperature management protocol.

## 2019-01-29 NOTE — ED NOTES
"Pt arrived by EMS.  EMS reports pt's wife found pt slumped over the bathtub, apneic.  EMS reports "pt down for 15 minutes."  Pt intubation en route.  Was given 3 Epi, 1.5 amp bicarb, 1 norepi en route.  CPR ongoing with Lucus machine.  Pt cyanotic at fingers and lips, unresponsive, no purposeful movement.  "

## 2019-01-29 NOTE — ASSESSMENT & PLAN NOTE
Persistent despite reasonable volume resuscitation.  Patient appears more less euvolemic at this time.  Patient has been treated with broad-spectrum antibiotics which is reasonable for now pending culture results however low suspicion for infectious treated his cardiac arrest.

## 2019-01-29 NOTE — ASSESSMENT & PLAN NOTE
- Cardiac arrest unclear etiology  - Infectious vs. neurologic vs. cardiogenic  - Down approx 15 min in field, PEA on EMS arrival, V-fib in ED and shocked once  - Amiodarone gtt initiated in ED, continue  - Continue norepinephrine gtt, vasopressin gtt  - Wife reports patient may be using furosemide at home but does not recall if diagnosed with CHF  - 2D Echo for post-arrest function, ?underlying CHF  - NS 30mL/kg bolus for sepsis, pip-tazo 4.5g IV q8hr, vancomycin 1g IV q12hr for sepsis, blood cultures, urine culture, respiratory culture.  - CT Head, ?underlying bleed that may have caused arrest  - Trend troponin  - Cooling protocol  - Discussed with family; patient's daughters flying from [place], [place], would prefer full measures taken at least until their arrival

## 2019-01-29 NOTE — EICU
LA up.  Discussed with bed side RN.  No dialysis. Troponin > 97207.  Follow ABG at 8 am.  Consider bicarb drip. Aneuric.  Just received sod bicarb 50 that I have ordered.

## 2019-01-29 NOTE — H&P
"[facility] Hospital Medicine  History & Physical    Patient Name: Duane Fulton  MRN: [id number]  Admission Date: 1/28/2019  Attending Physician: TICO Taylor MD  Primary Care Provider: No primary care provider on file.    Patient information was obtained from patient, spouse/SO, past medical records and ER records.     Subjective:     Principal Problem:Cardiac arrest    Chief Complaint:   Chief Complaint   Patient presents with    Cardiac Arrest     pt  found down by wife .  pt down 15 min before ems arrival .        HPI: Mr. Fulton is a 78/M with PMH pulmonary fibrosis, prostate cancer, HLD who presented to ED 01/28 after cardiac arrest. Per wife he returned from cardiology appointment in the evening and went upstairs; shortly thereafter she found him slumped over the edge of the bathtub on the floor. She called 911 and attempted to flip him to perform CPR but was unsuccessful and CPR was initiated by EMS; unclear how long he was down, per wife "maybe about 15 minutes". He was likewise intubated by EMS in the field. Reportedly received 3x epinephrine, 1.5 amps bicarb, 1x norepinephrine en route. In ED found to be in V-fib and shocked; later was in PEA. Started on norepinephrine, vasopressin, amiodarone gtts in ED.    Past Medical History:   Diagnosis Date    Hyperlipidemia     Prostate cancer     Pulmonary fibrosis      History reviewed. No pertinent surgical history.    Review of patient's allergies indicates:  No Known Allergies    No current facility-administered medications on file prior to encounter.      No current outpatient medications on file prior to encounter.     Family History   Problem Relation Age of Onset    Heart disease Father      Tobacco Use    Smoking status: Unknown If Ever Smoked   Substance and Sexual Activity    Alcohol use: Not on file    Drug use: Not on file    Sexual activity: Not on file     Review of Systems   Unable to perform ROS: Intubated "     Objective:     Vital Signs (Most Recent):  Temp: 96.7 °F (35.9 °C) (01/28/19 1703)  Pulse: (!) 58 (01/28/19 1719)  Resp: (!) 0 (01/28/19 1704)  BP: (!) 105/58 (01/28/19 1719) Vital Signs (24h Range):  Temp:  [96.7 °F (35.9 °C)] 96.7 °F (35.9 °C)  Pulse:  [58-86] 58  Resp:  [0-24] 0  BP: ()/() 105/58     Weight: 74.8 kg (165 lb)  There is no height or weight on file to calculate BMI.    Physical Exam   Constitutional: He appears well-developed.   HENT:   Head: Normocephalic and atraumatic.   Eyes: Conjunctivae are normal.   Pupils fixed and dilated   Cardiovascular: S1 normal, S2 normal and intact distal pulses. Bradycardia present.   Pulmonary/Chest:   Mechanical breath sounds   Abdominal: Soft. He exhibits no distension. There is no tenderness.   Musculoskeletal: He exhibits edema (BLE).   Neurological: GCS eye subscore is 1. GCS verbal subscore is 1. GCS motor subscore is 1.   Skin: Skin is dry.   Chronic venous stasis changes BLE   Nursing note and vitals reviewed.    Significant Labs:   Recent Results (from the past 24 hour(s))   POCT glucose    Collection Time: 01/28/19  4:07 PM   Result Value Ref Range    POCT Glucose 49 (LL) 70 - 110 mg/dL   Troponin I    Collection Time: 01/28/19  4:13 PM   Result Value Ref Range    Troponin I 0.146 (H) 0.000 - 0.026 ng/mL   Comprehensive metabolic panel    Collection Time: 01/28/19  4:13 PM   Result Value Ref Range    Sodium 138 136 - 145 mmol/L    Potassium 3.4 (L) 3.5 - 5.1 mmol/L    Chloride 93 (L) 95 - 110 mmol/L    CO2 23 23 - 29 mmol/L    Glucose 168 (H) 70 - 110 mg/dL    BUN, Bld 32 (H) 8 - 23 mg/dL    Creatinine 1.4 0.5 - 1.4 mg/dL    Calcium 8.5 (L) 8.7 - 10.5 mg/dL    Total Protein 6.0 6.0 - 8.4 g/dL    Albumin 2.7 (L) 3.5 - 5.2 g/dL    Total Bilirubin 0.7 0.1 - 1.0 mg/dL    Alkaline Phosphatase 141 (H) 55 - 135 U/L     (H) 10 - 40 U/L     (H) 10 - 44 U/L    Anion Gap 22 (H) 8 - 16 mmol/L    eGFR if African American 55 (A) >60  mL/min/1.73 m^2    eGFR if non African American 47 (A) >60 mL/min/1.73 m^2   CBC auto differential    Collection Time: 01/28/19  4:13 PM   Result Value Ref Range    WBC 14.75 (H) 3.90 - 12.70 K/uL    RBC 3.67 (L) 4.60 - 6.20 M/uL    Hemoglobin 10.8 (L) 14.0 - 18.0 g/dL    Hematocrit 33.8 (L) 40.0 - 54.0 %    MCV 92 82 - 98 fL    MCH 29.4 27.0 - 31.0 pg    MCHC 32.0 32.0 - 36.0 g/dL    RDW 17.2 (H) 11.5 - 14.5 %    Platelets 119 (L) 150 - 350 K/uL    MPV 9.5 9.2 - 12.9 fL    Gran # (ANC) CANCELED 1.8 - 7.7 K/uL    Lymph # CANCELED 1.0 - 4.8 K/uL    Mono # CANCELED 0.3 - 1.0 K/uL    Eos # CANCELED 0.0 - 0.5 K/uL    Baso # CANCELED 0.00 - 0.20 K/uL    Gran% 65.0 38.0 - 73.0 %    Lymph% 23.0 18.0 - 48.0 %    Mono% 6.0 4.0 - 15.0 %    Eosinophil% 0.0 0.0 - 8.0 %    Basophil% 0.0 0.0 - 1.9 %    Bands 3.0 %    Metamyelocytes 3.0 %    Platelet Estimate Appears normal     Aniso Slight     Poik Slight     Tear Drop Cells Occasional     [place] Cells Occasional     Schistocytes Present     Large/Giant Platelets Present     Differential Method Manual    Magnesium    Collection Time: 01/28/19  4:13 PM   Result Value Ref Range    Magnesium 2.7 (H) 1.6 - 2.6 mg/dL   Brain natriuretic peptide    Collection Time: 01/28/19  4:13 PM   Result Value Ref Range     (H) 0 - 99 pg/mL   Lactic acid, plasma    Collection Time: 01/28/19  4:19 PM   Result Value Ref Range    Lactate (Lactic Acid) >12.0 (HH) 0.5 - 2.2 mmol/L   ISTAT PROCEDURE    Collection Time: 01/28/19  4:31 PM   Result Value Ref Range    POC PH 7.225 (LL) 7.35 - 7.45    POC PCO2 56.1 (HH) 35 - 45 mmHg    POC PO2 607 (H) 80 - 100 mmHg    POC HCO3 23.2 (L) 24 - 28 mmol/L    POC BE -4 -2 to 2 mmol/L    POC SATURATED O2 100 95 - 100 %    Rate 20     Sample ARTERIAL     Site RB     Allens Test N/A     DelSys Adult Vent     Mode AC/PRVC     Vt 350     PEEP 5     FiO2 100    Urinalysis    Collection Time: 01/28/19  4:51 PM   Result Value Ref Range    Specimen UA Urine,  Catheterized     Color, UA Orange (A) Yellow, Straw, Marlene    Appearance, UA Hazy (A) Clear    pH, UA 6.0 5.0 - 8.0    Specific Gravity, UA 1.020 1.005 - 1.030    Protein, UA 2+ (A) Negative    Glucose, UA Negative Negative    Ketones, UA Negative Negative    Bilirubin (UA) 1+ (A) Negative    Occult Blood UA 3+ (A) Negative    Nitrite, UA Negative Negative    Urobilinogen, UA Negative <2.0 EU/dL    Leukocytes, UA Negative Negative   Urinalysis Microscopic    Collection Time: 01/28/19  4:51 PM   Result Value Ref Range    RBC, UA >100 (H) 0 - 4 /hpf    WBC, UA 20 (H) 0 - 5 /hpf    WBC Clumps, UA Occasional (A) None-Rare    Bacteria, UA Occasional None-Occ /hpf    Yeast, UA None None    Squam Epithel, UA 2 /hpf    Non-Squam Epith 0 <1/hpf /hpf    Hyaline Casts, UA 0 0-1/lpf /lpf    Microscopic Comment SEE COMMENT    POCT glucose    Collection Time: 01/28/19  4:56 PM   Result Value Ref Range    POCT Glucose 194 (H) 70 - 110 mg/dL   Protime-INR    Collection Time: 01/28/19  5:25 PM   Result Value Ref Range    Prothrombin Time 15.1 (H) 9.0 - 12.5 sec    INR 1.4 (H) 0.8 - 1.2   APTT    Collection Time: 01/28/19  5:25 PM   Result Value Ref Range    aPTT 35.4 (H) 21.0 - 32.0 sec   ISTAT PROCEDURE    Collection Time: 01/28/19  6:22 PM   Result Value Ref Range    POC PH 7.341 (L) 7.35 - 7.45    POC PCO2 36.2 35 - 45 mmHg    POC PO2 160 (H) 80 - 100 mmHg    POC HCO3 19.6 (L) 24 - 28 mmol/L    POC BE -6 -2 to 2 mmol/L    POC SATURATED O2 99 95 - 100 %    Rate 25     Sample ARTERIAL     Site LR     Allens Test Pass     DelSys Adult Vent     Mode AC/PRVC     Vt 350     PEEP 5     PiP 23     FiO2 35     Min Vol 10      Significant Imaging:   CXR 01/28/19:  Cardiomegaly with findings suggesting edema.  There is suspected chronic interstitial change noting fibrotic change projected over the right lower lung zone.  Comparison with previous examinations would be helpful.  No convincing pneumothorax.    Assessment/Plan:     * Cardiac  arrest    - Cardiac arrest unclear etiology  - Infectious vs. neurologic vs. cardiogenic  - Down approx 15 min in field, PEA on EMS arrival, V-fib in ED and shocked once  - Amiodarone gtt initiated in ED, continue  - Continue norepinephrine gtt, vasopressin gtt  - Wife reports patient may be using furosemide at home but does not recall if diagnosed with CHF  - 2D Echo for post-arrest function, ?underlying CHF  - NS 30mL/kg bolus for sepsis, pip-tazo 4.5g IV q8hr, vancomycin 1g IV q12hr for sepsis, blood cultures, urine culture, respiratory culture.  - CT Head, ?underlying bleed that may have caused arrest  - Trend troponin  - Cooling protocol  - Discussed with family; patient's daughters flying from [place], [place], would prefer full measures taken at least until their arrival     Metabolic encephalopathy    - 2/2 arrest  - GCS 3, pupils fixed and dilated, no corneal, no gag, no withdrawal to pain  - CT Head WO to rule out acute neurological process     Transaminitis    - 2/2 arrest     DANA (acute kidney injury)    - Unknown baseline, last labs in [facility] () show Cr 0.8, suspect DANA in setting of arrest     Elevated brain natriuretic peptide (BNP) level    - ?underlying CHF vs. 2/2 arrest     Elevated troponin    - 2/2 arrest     Hyperlipidemia    - ?cardiovascular disease underlying, check lipid panel given arrest     Acute hypoxemic respiratory failure    - 2/2 arrest  - Intubated in field, CXR shows ETT approx 5cm above aurea  - Post-arrest AB.34/36/160  - Pulm/critical care consult for assistance       VTE Risk Mitigation (From admission, onward)        Ordered     IP VTE HIGH RISK PATIENT  Once      19 1802     Place sequential compression device  Until discontinued      19 1725         Critical care time spent on the evaluation and treatment of severe organ dysfunction, review of pertinent labs and imaging studies, discussions with consulting providers and discussions with patient/family:  135 minutes.    MYESHA Rose MD  Department of Hospital Medicine   [facility]  [phone]

## 2019-01-29 NOTE — PROGRESS NOTES
Deanna from [facility] called this am and stated that pt is not a candidate for organ donation due to medical history. Reference number [id number]

## 2019-01-29 NOTE — SUBJECTIVE & OBJECTIVE
Interval History:  Patient unresponsive off any sedation.  Patient became hypothermic on his own.  Patient is still hypotensive requiring vasopressor support.  Patient with evidence of worsening renal failure.  Patient unresponsive.    Review of Systems   Unable to perform ROS: Intubated     Objective:     Vital Signs (Most Recent):  Temp: (!) 94.6 °F (34.8 °C) (01/29/19 0545)  Pulse: (!) 50 (01/29/19 0545)  Resp: (!) 27 (01/29/19 0545)  BP: (!) 94/55 (01/29/19 0545)  SpO2: 95 % (01/29/19 0545) Vital Signs (24h Range):  Temp:  [94.6 °F (34.8 °C)-96.7 °F (35.9 °C)] 94.6 °F (34.8 °C)  Pulse:  [42-86] 50  Resp:  [0-33] 27  SpO2:  [89 %-100 %] 95 %  BP: ()/() 94/55     Weight: 75.6 kg (166 lb 10.7 oz)  Body mass index is 26.1 kg/m².    Intake/Output Summary (Last 24 hours) at 1/29/2019 0812  Last data filed at 1/29/2019 0558  Gross per 24 hour   Intake 3051.72 ml   Output 10 ml   Net 3041.72 ml      Physical Exam   Constitutional:   Intubated and unresponsive.  Appears to be breathing over the ventilator.   HENT:   Oral gastric tube with some coffee ground appearing output.   Cardiovascular: Normal rate, regular rhythm, normal heart sounds and intact distal pulses. Exam reveals no gallop and no friction rub.   No murmur heard.  Triple-lumen in his right groin.   Pulmonary/Chest: Effort normal and breath sounds normal. No respiratory distress. He has no wheezes. He has no rales.   Abdominal: Soft. Bowel sounds are normal. He exhibits no distension. There is no tenderness.   Musculoskeletal: Normal range of motion. He exhibits no edema or tenderness.   Neurological:   Unresponsive off all sedation.  Bilaterally dilated and unresponsive pupils.   Skin: Skin is dry. No rash noted. No erythema. No pallor.   Cool to touch.  Distal pulses appreciated.       Significant Labs: All pertinent labs within the past 24 hours have been reviewed.    Significant Imaging: I have reviewed all pertinent imaging results/findings  within the past 24 hours.

## 2019-01-29 NOTE — PROGRESS NOTES
Pt arrived to unit. No gag, corneal reflex, or response to painful stimuli. Agonal breathing on vent noted. Bilateral pupils fixed and dilated. Dr. Taylor aware. Contacted [facility] supervisor to obtain cooling module so that cooling protocol can be initiated per MD order. Pt on  Norepinephrine 2.5/mcg/kg/min, amiodarone at 33.3mg/min, and vasopressin at 0.04 units per min.

## 2019-01-29 NOTE — SIGNIFICANT EVENT
Throughout the day, patient's Levophed requirements were increasing until maxed out at 3 with Vasopressin on board. Patient's BP started to drop and patient's status was discussed with the family. The decision to terminally wean and keep patient comfortable was made. Patient was extubated and family was given time in the room with him while pressors were on. Pressors were turned off and patient passed at 13:46 peacefully with his family at bedside.    Gely Lorenzo MD  [employer] PCCM Fellow

## 2019-01-29 NOTE — PLAN OF CARE
Problem: Adult Inpatient Plan of Care  Goal: Plan of Care Review  Outcome: Ongoing (interventions implemented as appropriate)  Pt remains intubated and on mechanical ventilation. Oral care regularly provided. AM ABG done and reported to Mahesh RONQUILLO, respiratory rate on ventilator increase per Mahesh following ABG. Will continue to monitor.

## 2019-01-29 NOTE — PROGRESS NOTES
Unable to obtain blood pressure from automatic cuff. Levo now at peak gerald of 3mcg/kg/min. Manual BP obtained 60/30. Dr. Lorenzo aware. Family notified to have remaining members come to see patient if they wish.

## 2019-01-29 NOTE — EICU
K level still low at 3.2 after Kcl 20 rider.  Mg ok.  S/p cardiac arrest, pulmonary fibrosis on pressors.     Kcl 20 meq IV re ordered.  Creat 1.7.

## 2019-01-29 NOTE — HOSPITAL COURSE
Mr. Fulton is a 78 year-old man with a history of pulmonary fibrosis, prostate cancer, dyslipidemia who suffered an out-of-hospital cardiac arrest who was resuscitated by EMS in the field but subsequently suffered a second cardiac arrest in the emergency department.  Patient initially noted to be in ventricular fibrillation on presentation subsequent developed pulseless electrical activity.  He regained his pulse but remained unresponsive and hypotensive requiring vasopressor support.  Patient admitted to the intensive care unit.  Patient noted to have significant coffee-ground output from his ogastric tube.  Patient started on proton pump inhibitor therapy with improvement.  Hemoglobin stable with decrease output from orogastric (OG) tube.    Patient has remained unresponsive and continued to require vasopressor support to maintain reasonable blood pressure. Targeted temperature management ordered however patient became hypothermic without active cooling.  CT head performed  without evidence of acute intracranial bleeding or hydrocephalus.  Patient with poor gray-white matter differentiation concerning for possible global anoxic hypoxic injury.   Patient also had evidence of renal failure with worsening metabolic acidosis and anuric.  Following discussions with the family overnight, decision was made to not pursue renal replacement therapy and not to pursue further chest compressions in the event of a 3rd cardiac arrest.    Despite increased vasopressor support patient became progressively more hypotensive this morning.  Decision was made to terminally extubate the patient off the ventilator and focus on comfort care.  Patient was extubated and patient  and declared dead at 13:46 hours today on 2019.  Preliminary cause of death is anoxic brain injury complicating cardiac arrests possibly precipitated by an acute pulmonary embolism or an acute myocardial infarction.

## 2019-01-29 NOTE — ASSESSMENT & PLAN NOTE
Secondary to cardiac arrest.  Patient with out urine output and with evidence of worsening metabolic acidosis.  Family not interested in pursuing dialysis despite worsening kidney function.  Continue with supportive care.

## 2019-01-29 NOTE — DISCHARGE SUMMARY
"[facility] Hospital Medicine  Discharge Summary      Patient Name: Duane Fulton  MRN: [id number]  Admission Date: 1/28/2019  Hospital Length of Stay: 1 days  Discharge Date and Time:  01/29/2019  Attending Physician: Robert Nguyen MD   Discharging Provider: Robert Nguyen MD      HPI:   Mr. Fulton is a 78 year-old man with a history of pulmonary fibrosis, prostate cancer, dyslipidemia who presented to emergency department on 1/28/2019 following cardiac arrest at his home. Per wife he returned from cardiology appointment in the evening and went upstairs and she reports that she shortly found him slumped over the edge of the bathtub on the floor. She called 911 and attempted to flip him to perform CPR but was unsuccessful.  CPR was initiated by EMS; unclear how long he was down, per wife "maybe about 15 minutes".  Return of spontaneous circulation (ROSC) obtained in the field and in was intubated.  Upon arrival to the emergency department, patient become pulseless again.  Patient underwent further resuscitation efforts.  Patient noted to be in ventricular fibrillation and shocked and then later noted to have pulseless electrical activity arrest.  Patient regained his pulse but remained unresponsive and hypotensive.  Patient started on continuous infusions of norepinephrine and vasopressin along with intravenous amiodarone Emergency Department.  Patient admitted to the intensive care unit at [facility].    Hospital Course:   Mr. Fulton is a 78 year-old man with a history of pulmonary fibrosis, prostate cancer, dyslipidemia who suffered an out-of-hospital cardiac arrest who was resuscitated by EMS in the field but subsequently suffered a second cardiac arrest in the emergency department.  Patient initially noted to be in ventricular fibrillation on presentation subsequent developed pulseless electrical activity.  He regained his pulse but remained unresponsive and hypotensive " requiring vasopressor support.  Patient admitted to the intensive care unit.  Patient noted to have significant coffee-ground output from his ogastric tube.  Patient started on proton pump inhibitor therapy with improvement.  Hemoglobin stable with decrease output from orogastric (OG) tube.    Patient has remained unresponsive and continued to require vasopressor support to maintain reasonable blood pressure. Targeted temperature management ordered however patient became hypothermic without active cooling.  CT head performed  without evidence of acute intracranial bleeding or hydrocephalus.  Patient with poor gray-white matter differentiation concerning for possible global anoxic hypoxic injury.   Patient also had evidence of renal failure with worsening metabolic acidosis and anuric.  Following discussions with the family overnight, decision was made to not pursue renal replacement therapy and not to pursue further chest compressions in the event of a 3rd cardiac arrest.    Despite increased vasopressor support patient became progressively more hypotensive this morning.  Decision was made to terminally extubate the patient off the ventilator and focus on comfort care.  Patient was extubated and patient  and declared dead at 13:46 hours today on 2019.  Preliminary cause of death is anoxic brain injury complicating cardiac arrests possibly precipitated by an acute pulmonary embolism or an acute myocardial infarction.     Consults:   Consults (From admission, onward)        Status Ordering Provider     Inpatient consult to Pulmonary Critical Care  Once     Provider:  Gely Lorenzo MD    Completed MYESHA ELLISON        Final Active Diagnoses:    Diagnosis Date Noted POA    PRINCIPAL PROBLEM:  Cardiac arrest [I46.9] 2019 Yes    Anoxic brain injury [G93.1] 2019 Yes    Acute hypoxemic respiratory failure [J96.01] 2019 Yes    Lactic acidosis [E87.2] 2019 Yes    Acute kidney  injury [N17.9] 2019 Yes    Metabolic acidosis [E87.2] 2019 Yes    Elevated brain natriuretic peptide (BNP) level [R79.89] 2019 Yes    Elevated troponin [R74.8] 2019 Yes    Pulmonary fibrosis [J84.10] 2019 Yes     Chronic    Transaminitis [R74.0] 2019 Yes      Problems Resolved During this Admission:       Discharged Condition:     Disposition:     Medications:  None (patient  at medical facility)    Indwelling Lines/Drains at time of discharge:   Lines/Drains/Airways     Central Venous Catheter Line                 Percutaneous Central Line Insertion/Assessment - triple lumen  19 1636  less than 1 day          Drain                 NG/OG Tube 19 [place] sump 18 Fr. Center mouth less than 1 day         Urethral Catheter 19 1626 Straight-tip less than 1 day          Airway                 Airway - Non-Surgical 19 1610 Endotracheal Tube less than 1 day                Time spent on the discharge of patient: 35 minutes  Patient was seen and examined on the date of discharge and determined to be suitable for discharge.         Robert Nguyen MD  Department of Hospital Medicine  [facility]

## 2019-01-29 NOTE — ASSESSMENT & PLAN NOTE
- 2/2 arrest  - Intubated in field, CXR shows ETT approx 5cm above aurea  - Post-arrest AB.34/36/160  - Pulm/critical care consult for assistance

## 2019-01-29 NOTE — CONSULTS
Pulmonary & Critical Care Medicine   Consultation Note    Reason for Consultation: Cardiac arrest, vent management    HPI: Mr. 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     After speaking with wife, she states that patient was not feeling well today. He went up stairs to take a nap and she followed 5 minutes later to check on him. She found him slumped over the side of the bathtub unresponsive. She immediately called 911 and they showed up in 10 minutes. Resuscitation efforts took place at home for about 15 minutes (received Epi x3), patient was intubated, and patient was taken to the [facility] ED.    In the [facility] ED, patient arrested again for about 15 minutes - initial rhythm v.fib and later PEA. Patient started on Levophed, Vasopressin, and Amiodarone post-ROSC.    Wife states that recently patient had been having increased edema in the bilateral thighs. Otherwise, no recent complaints of fevers, chills, chest pain, N/V/D, abdominal pain, dysuria or increased frequency. Patient does have chronic SOB 2/2 to his ILD. 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    When patient came up to the ICU, he was continued on Norepi, Vaso, and Amio. 30 cc/kg NS was administered for sepsis and Vanc/Zosyn were started after patient was pan-cultured. CT head did not show evidence of hemorrhage but did show poor gray-white matter differentiation diffusely consistent with global anoxic injury. Patient was already hypothermic at 94.6 degrees farenheit. WBC 14.75 with elevated LFTs, DANA, trop bump of 0.146, and BNP of 850. Patient is requiring 2.5 of Levophed and Vasopressin to maintain pressures.    Spoke to patient's family extensively - they understand patient's diagnosis and prognosis and do not want chest  compressions or dialysis. Bedside U/S shows bowing of the septum, however, patient is bradycardic in the 40's. No obvious RV enlargement.    Past Medical History:   Diagnosis Date    Hyperlipidemia     Prostate cancer     Pulmonary fibrosis      History reviewed. No pertinent surgical history.  Social History:   Social History     Socioeconomic History    Marital status:      Spouse name: Not on file    Number of children: Not on file    Years of education: Not on file    Highest education level: Not on file   Social Needs    Financial resource strain: Not on file    Food insecurity - worry: Not on file    Food insecurity - inability: Not on file    Transportation needs - medical: Not on file    Transportation needs - non-medical: Not on file   Occupational History    Not on file   Tobacco Use    Smoking status: Unknown If Ever Smoked   Substance and Sexual Activity    Alcohol use: Not on file    Drug use: Not on file    Sexual activity: Not on file   Other Topics Concern    Not on file   Social History Narrative    Not on file     Family History   Problem Relation Age of Onset    Heart disease Father      Drug Allergies:   Review of patient's allergies indicates:  No Known Allergies    Current Infusions:   norepinephrine bitartrate-D5W 2.1 mcg/kg/min (01/28/19 2030)    vasopressin (PITRESSIN) infusion 0.04 Units/min (01/28/19 1730)     Scheduled Medications:     acetaminophen  650 mg Per NG tube Q6H    busPIRone  30 mg Per NG tube Once    piperacillin-tazobactam (ZOSYN) IVPB  4.5 g Intravenous ED 1 Time    [START ON 1/29/2019] piperacillin-tazobactam (ZOSYN) IVPB  4.5 g Intravenous Q8H    [START ON 1/29/2019] vancomycin (VANCOCIN) IVPB  15 mg/kg Intravenous Q12H    vasopressin         PRN Medications:   magnesium sulfate IVPB, sodium chloride 0.9%    Review of Systems:   A comprehensive 12-point review of systems was performed, and is negative except for those items mentioned  above in the HPI section of this note.     Vital Signs:    Vitals:    01/28/19 1719   BP: (!) 105/58   Pulse: (!) 58   Resp:    Temp:      Fluid Balance:     Intake/Output Summary (Last 24 hours) at 1/28/2019 2050  Last data filed at 1/28/2019 1650  Gross per 24 hour   Intake 1000 ml   Output --   Net 1000 ml     Physical Exam:   General: Lying down, unresponsive, NAD  HEENT: NC/AT, PERRL, EOMI, no scleral icterus, moist mucous membranes, intubated.  Neck: Supple without JVD, trachea midline, no thyromegaly  Cardiac: S1S2 auscultated, RRR, no murmurs/rubs/gallops  Resp: Normal inspection. Symmetric chest rise. Auscultation clear bilaterally with no increased work of breathing. Good inspiratory effort. No wheezes/rhonchi/crackles.  Abd: Soft, NT/ND, +BS, no HSM, no palpable masses  Ext: No edema, no cyanosis, no clubbing, 2+ pulses present  Skin: Cool and dry, no rashes, no lesions, no jaundice  Neuro: Unresponsive, no reflexes at this time    Personal Review and Summary of Prior Diagnostics    Laboratory Studies:   Recent Labs   Lab 01/28/19  1822   PH 7.341*   PCO2 36.2   PO2 160*   HCO3 19.6*   POCSATURATED 99   BE -6     Recent Labs   Lab 01/28/19  1613   WBC 14.75*   RBC 3.67*   HGB 10.8*   HCT 33.8*   *   MCV 92   MCH 29.4   MCHC 32.0     Recent Labs   Lab 01/28/19  1613      K 3.4*   CL 93*   CO2 23   BUN 32*   CREATININE 1.4   MG 2.7*     Microbiology Data:   Microbiology Results (last 7 days)     Procedure Component Value Units Date/Time    Blood culture [[id number]] Collected:  01/28/19 2019    Order Status:  Sent Specimen:  Blood from Line, Femoral, Right Updated:  01/28/19 2020    Blood culture [[id number]] Collected:  01/28/19 2015    Order Status:  Sent Specimen:  Blood Updated:  01/28/19 2017    Urine culture [[id number]] Collected:  01/28/19 1651    Order Status:  Sent Specimen:  Urine, Catheterized Updated:  01/28/19 1836    Blood Culture #1 **CANNOT BE ORDERED STAT** [[id number]] Collected:   19 1725    Order Status:  Sent Specimen:  Blood Updated:  19 1833    Culture, Respiratory with Gram Stain [[id number]]     Order Status:  No result Specimen:  Respiratory from Sputum     Blood Culture #2 **CANNOT BE ORDERED STAT** [[id number]] Collected:  19 1800    Order Status:  Sent Specimen:  Blood Updated:  19        Summary of Chest Imaging Personally Reviewed:   CXR: RLL interstitial changes, airspace opacities bilaterally likely pulmonary edema.    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 Patient was down for at least 45 minutes (30 minutes with compressions). CT head shows poor gray-white matter differentiation diffusely and patient does not have any reflexes at this time. Patient is being maintained at 36 degrees celsius and will be rewarmed today in the evening.    Neuro:  -Not on any sedation  -Unresponsive  -No reflexes  -Cooled to 36 degrees  -CT head showed poor gray-white differentiation consistent with anoxic brain injury.    Pulm:  -AB. on 350/30/5/30%  -Vent settings changed to: 400/30/5/25%  -Oxygenating well at this time  -Hx of RA-ILD with pulmonary hypertension  -Would obtain LE U/S dopplers to look for PE.    Cardio:  -Bradycardic in the 40's  -In shock on Levo and Vaso.  -Unclear etiology. Likely obstructive from PE or cardiogenic from MI.  -Bedside U/S showed bowing of septum.  -Troponin trending up greater than 50. On Amio.  -EKG shows Q waves in anterior leads  -Septic shock is less likely, but treating for sepsis.    GI:  -Bleeding from OG tube.  -PPI IV BID started.  -No heparin products being given.  -LFTs elevated in the 300's due to shock.    Renal:  -DANA 2/2 to ATN from arrest.  -Anuric  -Terrible acidosis 2/2 to lactic acidosis >12.  -Family does not want dialysis.  -Blowing of CO2 with  ventilator to help with compensation.  -(+)3L at this time.    ID:  -WBC elevated 14 --> 23.  -Hypothermic  -Pan-cultured.  -Being treated with Vanc and Zosyn.    DVT ppx: Being held due to tex bleeding from the OG tube  GI ppx: PPI BID  VAP ppx: Chlorhexidine    Code status: DNR/no dialysis    Thank you for involving us in the care of this patient. We will continue to follow along. Please call with any questions.    Gely Lorenzo MD  [facility] PCCM Fellow, PGY 5  [facility]  Pager: [phone]

## 2019-01-29 NOTE — EICU
ABG notifed.  Has mixed resp and met acidosis. With pH 7.24, worsening.    Rate increased to 28.  Sod bicarb 50 meq stat IV once  Follow ABG at 8 am.

## 2019-01-29 NOTE — SUBJECTIVE & OBJECTIVE
Past Medical History:   Diagnosis Date    Hyperlipidemia     Prostate cancer     Pulmonary fibrosis      History reviewed. No pertinent surgical history.    Review of patient's allergies indicates:  No Known Allergies    No current facility-administered medications on file prior to encounter.      No current outpatient medications on file prior to encounter.     Family History   Problem Relation Age of Onset    Heart disease Father      Tobacco Use    Smoking status: Unknown If Ever Smoked   Substance and Sexual Activity    Alcohol use: Not on file    Drug use: Not on file    Sexual activity: Not on file     Review of Systems   Unable to perform ROS: Intubated     Objective:     Vital Signs (Most Recent):  Temp: 96.7 °F (35.9 °C) (01/28/19 1703)  Pulse: (!) 58 (01/28/19 1719)  Resp: (!) 0 (01/28/19 1704)  BP: (!) 105/58 (01/28/19 1719) Vital Signs (24h Range):  Temp:  [96.7 °F (35.9 °C)] 96.7 °F (35.9 °C)  Pulse:  [58-86] 58  Resp:  [0-24] 0  BP: ()/() 105/58     Weight: 74.8 kg (165 lb)  There is no height or weight on file to calculate BMI.    Physical Exam   Constitutional: He appears well-developed.   HENT:   Head: Normocephalic and atraumatic.   Eyes: Conjunctivae are normal.   Pupils fixed and dilated   Cardiovascular: S1 normal, S2 normal and intact distal pulses. Bradycardia present.   Pulmonary/Chest:   Mechanical breath sounds   Abdominal: Soft. He exhibits no distension. There is no tenderness.   Musculoskeletal: He exhibits edema (BLE).   Neurological: GCS eye subscore is 1. GCS verbal subscore is 1. GCS motor subscore is 1.   Skin: Skin is dry.   Chronic venous stasis changes BLE   Nursing note and vitals reviewed.    Significant Labs:   Recent Results (from the past 24 hour(s))   POCT glucose    Collection Time: 01/28/19  4:07 PM   Result Value Ref Range    POCT Glucose 49 (LL) 70 - 110 mg/dL   Troponin I    Collection Time: 01/28/19  4:13 PM   Result Value Ref Range    Troponin I  0.146 (H) 0.000 - 0.026 ng/mL   Comprehensive metabolic panel    Collection Time: 01/28/19  4:13 PM   Result Value Ref Range    Sodium 138 136 - 145 mmol/L    Potassium 3.4 (L) 3.5 - 5.1 mmol/L    Chloride 93 (L) 95 - 110 mmol/L    CO2 23 23 - 29 mmol/L    Glucose 168 (H) 70 - 110 mg/dL    BUN, Bld 32 (H) 8 - 23 mg/dL    Creatinine 1.4 0.5 - 1.4 mg/dL    Calcium 8.5 (L) 8.7 - 10.5 mg/dL    Total Protein 6.0 6.0 - 8.4 g/dL    Albumin 2.7 (L) 3.5 - 5.2 g/dL    Total Bilirubin 0.7 0.1 - 1.0 mg/dL    Alkaline Phosphatase 141 (H) 55 - 135 U/L     (H) 10 - 40 U/L     (H) 10 - 44 U/L    Anion Gap 22 (H) 8 - 16 mmol/L    eGFR if African American 55 (A) >60 mL/min/1.73 m^2    eGFR if non African American 47 (A) >60 mL/min/1.73 m^2   CBC auto differential    Collection Time: 01/28/19  4:13 PM   Result Value Ref Range    WBC 14.75 (H) 3.90 - 12.70 K/uL    RBC 3.67 (L) 4.60 - 6.20 M/uL    Hemoglobin 10.8 (L) 14.0 - 18.0 g/dL    Hematocrit 33.8 (L) 40.0 - 54.0 %    MCV 92 82 - 98 fL    MCH 29.4 27.0 - 31.0 pg    MCHC 32.0 32.0 - 36.0 g/dL    RDW 17.2 (H) 11.5 - 14.5 %    Platelets 119 (L) 150 - 350 K/uL    MPV 9.5 9.2 - 12.9 fL    Gran # (ANC) CANCELED 1.8 - 7.7 K/uL    Lymph # CANCELED 1.0 - 4.8 K/uL    Mono # CANCELED 0.3 - 1.0 K/uL    Eos # CANCELED 0.0 - 0.5 K/uL    Baso # CANCELED 0.00 - 0.20 K/uL    Gran% 65.0 38.0 - 73.0 %    Lymph% 23.0 18.0 - 48.0 %    Mono% 6.0 4.0 - 15.0 %    Eosinophil% 0.0 0.0 - 8.0 %    Basophil% 0.0 0.0 - 1.9 %    Bands 3.0 %    Metamyelocytes 3.0 %    Platelet Estimate Appears normal     Aniso Slight     Poik Slight     Tear Drop Cells Occasional     Rodger Cells Occasional     Schistocytes Present     Large/Giant Platelets Present     Differential Method Manual    Magnesium    Collection Time: 01/28/19  4:13 PM   Result Value Ref Range    Magnesium 2.7 (H) 1.6 - 2.6 mg/dL   Brain natriuretic peptide    Collection Time: 01/28/19  4:13 PM   Result Value Ref Range     (H) 0 - 99  pg/mL   Lactic acid, plasma    Collection Time: 01/28/19  4:19 PM   Result Value Ref Range    Lactate (Lactic Acid) >12.0 (HH) 0.5 - 2.2 mmol/L   ISTAT PROCEDURE    Collection Time: 01/28/19  4:31 PM   Result Value Ref Range    POC PH 7.225 (LL) 7.35 - 7.45    POC PCO2 56.1 (HH) 35 - 45 mmHg    POC PO2 607 (H) 80 - 100 mmHg    POC HCO3 23.2 (L) 24 - 28 mmol/L    POC BE -4 -2 to 2 mmol/L    POC SATURATED O2 100 95 - 100 %    Rate 20     Sample ARTERIAL     Site RB     Allens Test N/A     DelSys Adult Vent     Mode AC/PRVC     Vt 350     PEEP 5     FiO2 100    Urinalysis    Collection Time: 01/28/19  4:51 PM   Result Value Ref Range    Specimen UA Urine, Catheterized     Color, UA Orange (A) Yellow, Straw, Marlene    Appearance, UA Hazy (A) Clear    pH, UA 6.0 5.0 - 8.0    Specific Gravity, UA 1.020 1.005 - 1.030    Protein, UA 2+ (A) Negative    Glucose, UA Negative Negative    Ketones, UA Negative Negative    Bilirubin (UA) 1+ (A) Negative    Occult Blood UA 3+ (A) Negative    Nitrite, UA Negative Negative    Urobilinogen, UA Negative <2.0 EU/dL    Leukocytes, UA Negative Negative   Urinalysis Microscopic    Collection Time: 01/28/19  4:51 PM   Result Value Ref Range    RBC, UA >100 (H) 0 - 4 /hpf    WBC, UA 20 (H) 0 - 5 /hpf    WBC Clumps, UA Occasional (A) None-Rare    Bacteria, UA Occasional None-Occ /hpf    Yeast, UA None None    Squam Epithel, UA 2 /hpf    Non-Squam Epith 0 <1/hpf /hpf    Hyaline Casts, UA 0 0-1/lpf /lpf    Microscopic Comment SEE COMMENT    POCT glucose    Collection Time: 01/28/19  4:56 PM   Result Value Ref Range    POCT Glucose 194 (H) 70 - 110 mg/dL   Protime-INR    Collection Time: 01/28/19  5:25 PM   Result Value Ref Range    Prothrombin Time 15.1 (H) 9.0 - 12.5 sec    INR 1.4 (H) 0.8 - 1.2   APTT    Collection Time: 01/28/19  5:25 PM   Result Value Ref Range    aPTT 35.4 (H) 21.0 - 32.0 sec   ISTAT PROCEDURE    Collection Time: 01/28/19  6:22 PM   Result Value Ref Range    POC PH 7.341  (L) 7.35 - 7.45    POC PCO2 36.2 35 - 45 mmHg    POC PO2 160 (H) 80 - 100 mmHg    POC HCO3 19.6 (L) 24 - 28 mmol/L    POC BE -6 -2 to 2 mmol/L    POC SATURATED O2 99 95 - 100 %    Rate 25     Sample ARTERIAL     Site LR     Allens Test Pass     DelSys Adult Vent     Mode AC/PRVC     Vt 350     PEEP 5     PiP 23     FiO2 35     Min Vol 10      Significant Imaging:   CXR 01/28/19:  Cardiomegaly with findings suggesting edema.  There is suspected chronic interstitial change noting fibrotic change projected over the right lower lung zone.  Comparison with previous examinations would be helpful.  No convincing pneumothorax.

## 2019-01-29 NOTE — PLAN OF CARE
CM met with pt and family for initial discharge planning assessmetn.    Pt had cardiac arrest at home, is intubated and family is deciding when to withdraw care.    Daughter states their  is on the way to the hospital.    CM to follow.     01/29/19 8148   Discharge Assessment   Assessment Type Discharge Planning Assessment   Confirmed/corrected address and phone number on facesheet? Yes   Assessment information obtained from? Caregiver;Medical Record   Prior to hospitilization cognitive status: Coma/Sedated/Intubated   Prior to hospitalization functional status: Independent;Needs Assistance   Current cognitive status: Coma/Sedated/Intubated   Lives With spouse   Able to Return to Prior Arrangements other (see comments)  (family deciding when to withdraw care.)   Is patient able to care for self after discharge? No   Who are your caregiver(s) and their phone number(s)? Kaila Donaldo,. spouse, [phone]   Patient's perception of discharge disposition other (comments)   Readmission Within the Last 30 Days no previous admission in last 30 days   Patient currently being followed by outpatient case management? No   Patient currently receives any other outside agency services? No   Equipment Currently Used at Home none   Do you have any problems affording any of your prescribed medications? No   Is the patient taking medications as prescribed? yes   Does the patient have transportation home? Yes   Does the patient receive services at the [facility] Clinic? No   Discharge Plan A Other   Discharge Plan B Other   Patient/Family in Agreement with Plan other (see comments)

## 2019-01-29 NOTE — ASSESSMENT & PLAN NOTE
Likely secondary to global hypoperfusion with insult to the liver.  Continue supportive care measures.

## 2019-01-29 NOTE — PROGRESS NOTES
Patient seen and examined by me. I agree with the trainee's separate note except as modified.     78 year old male with history of pulmonary fibrosis, PH came in last night with cardiac arrest. Found on the floor by his wife. Sounds like he had a prolonged downtime. Achieved ROSC eventually and cooled.     No urine output. Temp 94, HR 50, MAP 66 on 1.7 Levo, Ami. 92% on 30% FiO2.    WBC 23, Hgb 10.8 to 9. INR 2.5. bicarb down to 14, creatinine 1.4 to 1.9. K 3.6. CTNI>50, lactate >12. 7.24/28/108/12/97% on 30%.    CT head with possible global anoxic injury.     CXR reviewed by me: ETT in place. Right base interstitial changes. Enlarged cardiac shadow. Left base obscured by pacer pad    On my exam: unresponsive     Impression: Cardiac arrest, likely sudden cardiac death given the acuity of the process. PE is also in the differential diagnosis, but unfortunately at this stage his prognosis is quite grim.     Plan:   -continuing family discussion regarding goals of care  -cont support with Levo/Vaso, Amio  -patient is DNR, no dialysis     Critical care time (30min) spent personally by me on the following activities: development of treatment plan with patient or surrogate and bedside caregivers, discussions with consultants, evaluation of patient's response to treatment, examination of patient, ordering and performing treatments and interventions, ordering and review of laboratory studies, ordering and review of radiographic studies, pulse oximetry, re-evaluation of patient's condition. This critical care time did not overlap with that of any other provider or involve time for any procedures.

## 2019-01-30 NOTE — PHYSICIAN QUERY
PT Name: Duane Fulton  MR #: [id number]     PHYSICIAN QUERY -  ELECTROLYTE CLARIFICATION      CDS/: Perlita Manzo RN              Contact information: [phone]  This form is a permanent document in the medical record.     Query Date: January 30, 2019    By submitting this query, we are merely seeking further clarification of documentation to reflect the severity of illness of your patient. Please utilize your independent clinical judgment when addressing the question(s) below.    The Medical record reflects the following:     Indicators   Supporting Clinical Findings Location in Medical Record   x Lab Value(s) Potassium = 3.4-> 3.2->3.6 Lab 1/28, 1/29, 1/29     x Treatment                                 Medication Potassium chloride IV MAR 1/28, 1/29    Other       Provider, please specify the diagnosis or diagnoses that correspond(s) to the above indicators. Bill all that apply:    [ X  ] Hypokalemia   [   ] Other electrolyte disturbance (please specify): _______   [   ]  Clinically Undetermined       Please document in your progress notes daily for the duration of treatment until resolved, and include in your discharge summary.

## 2019-01-31 LAB
BACTERIA SPEC AEROBE CULT: NORMAL
BACTERIA SPEC AEROBE CULT: NORMAL
GRAM STN SPEC: NORMAL

## 2019-02-01 LAB — NSE SERPL-MCNC: 41 NG/ML

## 2019-02-01 NOTE — PHYSICIAN QUERY
PT Name: Duane Fulton  MR #: [id number]     Physician Query Form - Diagnosis Clarification      CDS/: Perlita Manzo RN              Contact information: [phone]    This form is a permanent document in the medical record.     Query Date: February 1, 2019    By submitting this query, we are merely seeking further clarification of documentation.  Please utilize your independent clinical judgment when addressing the question(s) below.     The medical record contains the following:      Findings Supporting Clinical Information Location in Medical Record       sepsis   - NS 30mL/kg bolus for sepsis, pip-tazo 4.5g IV q8hr, vancomycin 1g IV q12hr for sepsis, blood cultures, urine culture, respiratory culture.     When patient came up to the ICU, he was continued on Norepi, Vaso, and Amio. 30 cc/kg NS was administered for sepsis and Vanc/Zosyn were started after patient was pan-cultured.       -Septic shock is less likely, but treating for sepsis.       WBC 14.75--> 23.76      Lactate    >12      Vital Signs (24h Range):   Temp:  [96.7 °F (35.9 °C)] 96.7 °F (35.9 °C)   Pulse:  [58-86] 58   Resp:  [0-24] 0   BP: ()/() 105/58     No growth      Normal respiratory cain   No S aureus or Pseudomonas isolated      No Growth to date     H&P 1/28          Consult 1/29       Pulmonology            Consult 1/29       Pulmonology      Lab 1/28, 1/29      Lab 1/28      H&P 1/28              Urine Culture 1/28      Respiratory Culture 1/28        Blood Culture 1/28     Please clarify if the _____sepsis_____________ diagnosis has been:    [  ] Ruled In   [x  ] Ruled Out   [  ] Other/Clarification of findings (please specify):     [  ] Clinically undetermined     Please document in your progress notes daily for the duration of treatment, until resolved, and include in your discharge summary.

## 2019-02-01 NOTE — PHYSICIAN QUERY
"PT Name: Duane Fulton  MR #: [id number]    Physician Query Form - Heart  Condition Clarification     CDS/: Perlita Manzo RN              Contact information: [phone]  This form is a permanent document in the medical record.     Query Date: February 1, 2019    By submitting this query, we are merely seeking further clarification of documentation. Please utilize your independent clinical judgment when addressing the question(s) below.    The medical record contains the following   Indicators     Supporting Clinical Findings Location in Medical Record   x BNP BNP= 850   LAB 1/28   x EF · The estimated ejection fraction is 25%   TTE 1/29   x Radiology findings Impression      1. Cardiomegaly with findings suggesting edema.  There is suspected chronic interstitial change noting fibrotic change projected over the right lower lung zone.  Comparison with previous examinations would be helpful.  No convincing pneumothorax.   Chest Xray 1/28   x Echo Results · Conclusion    · Mild concentric left ventricular hypertrophy.  · Severely decreased left ventricular systolic function. The estimated ejection fraction is 25%  · Grade III (severe) left ventricular diastolic dysfunction consistent with restrictive physiology.  · Elevated left atrial pressure.  · Global hypokinetic wall motion.  · Mild right ventricular enlargement.  · Moderately reduced right ventricular systolic function.  · Mild left atrial enlargement.  · Moderate right atrial enlargement.  · Mild mitral regurgitation.  · Mild tricuspid regurgitation.  · Normal central venous pressure (3 mm Hg).  · The estimated PA systolic pressure is 35 mm Hg   TTE 1/29    "Ascites" documented      "SOB" or "MAIER" documented      "Hypoxia" documented     x Heart Failure documented - Wife reports patient may be using furosemide at home but does not recall if diagnosed with CHF   - 2D Echo for post-arrest function, ?underlying CHF       Elevated brain natriuretic peptide " "(BNP) level   - ?underlying CHF vs. 2/2 arrest      H&P 1/28          H&P 1/28   x "Edema" documented Wife states that recently patient had been having increased edema in the bilateral thighs.    Consult 1/29       Pulmonology     Diuretics/Meds      Treatment:     x Other:  Acute hypoxemic respiratory failure - 2/2 arrest   - Intubated in field, CXR shows ETT approx 5cm above aurea    H&P 1/28   Heart failure (HF) can be acute, chronic or both. It is generally further specificed as systolic, diastolic, or combined. Lastly, it is important to identify an underlying etiology if known or suspected.     Common clues to acute exacerbation:  Rapidly progressive symptoms (w/in 2 weeks of presentation), using IV diuretics to treat, using supplemental O2, pulmonary edema on Xray, MI w/in 4 weeks, and/or BNP >500    Systolic Heart Failure: is defined as chart documentation of a left ventricular ejection fraction (LVEF) less than 40%     Diastolic Heart Failure: is defined as a left ventricular ejection fraction (LVEF) greater than 40%   +      Evidence of diastolic dysfunction on echocardiography OR    Right heart catheterization wedge pressure above 12 mm Hg OR    Left heart catheterization left ventricular end diastolic pressure 18 mm Hg or above.    References: *[employer] Association    The clinical guidelines noted below are only system guidelines, and do not replace the providers clinical judgment.     Provider, please specify the diagnosis associated with above clinical findings      [ X  ] Acute Combined Systolic and Diastolic Heart Failure                     [   ] Chronic Combined Systolic and Diastolic Heart Failure    [   ] Other Type of Heart Failure (please specify type): _________________________    [   ] Heart Failure Ruled Out    [   ] Other (please specify): ___________________________________    [   ] Clinically Undetermined                          Please document in your progress notes daily for " the duration of treatment until resolved and include in your discharge summary.

## 2019-02-02 LAB — BACTERIA BLD CULT: NORMAL

## 2019-02-03 LAB
BACTERIA BLD CULT: NORMAL